# Patient Record
Sex: FEMALE | Race: WHITE | Employment: UNEMPLOYED | URBAN - METROPOLITAN AREA
[De-identification: names, ages, dates, MRNs, and addresses within clinical notes are randomized per-mention and may not be internally consistent; named-entity substitution may affect disease eponyms.]

---

## 2016-09-06 LAB — N. GONORRHEA, EXTERNAL: NEGATIVE

## 2016-09-09 LAB
HBSAG, EXTERNAL: NEGATIVE
HCT, EXTERNAL: 40.4
HGB, EXTERNAL: 13.5
HIV, EXTERNAL: NEGATIVE
PLATELET CNT,   EXTERNAL: 229
RPR, EXTERNAL: NON REACTIVE
RUBELLA, EXTERNAL: NORMAL
TSH SERPL-ACNC: 1.17 M[IU]/L
TYPE, ABO & RH, EXTERNAL: NORMAL
URINALYSIS, EXTERNAL: NEGATIVE

## 2016-09-12 LAB — PAP SMEAR, EXTERNAL: NEGATIVE

## 2016-10-11 LAB — CHLAMYDIA, EXTERNAL: NEGATIVE

## 2017-01-13 ENCOUNTER — HOSPITAL ENCOUNTER (OUTPATIENT)
Dept: LAB | Age: 37
Discharge: HOME | End: 2017-01-13

## 2017-01-13 ENCOUNTER — ROUTINE PRENATAL (OUTPATIENT)
Dept: OBGYN CLINIC | Age: 37
End: 2017-01-13

## 2017-01-13 VITALS
HEIGHT: 68 IN | RESPIRATION RATE: 18 BRPM | DIASTOLIC BLOOD PRESSURE: 75 MMHG | BODY MASS INDEX: 32.74 KG/M2 | SYSTOLIC BLOOD PRESSURE: 111 MMHG | WEIGHT: 216 LBS | HEART RATE: 81 BPM

## 2017-01-13 DIAGNOSIS — Z3A.33 33 WEEKS GESTATION OF PREGNANCY: Primary | ICD-10-CM

## 2017-01-13 PROCEDURE — 99001 SPECIMEN HANDLING PT-LAB: CPT | Performed by: OBSTETRICS & GYNECOLOGY

## 2017-01-13 RX ORDER — GABAPENTIN 300 MG/1
CAPSULE ORAL
Qty: 90 CAP | Refills: 0 | Status: SHIPPED | OUTPATIENT
Start: 2017-01-13 | End: 2017-02-08 | Stop reason: SDUPTHER

## 2017-01-13 NOTE — PROGRESS NOTES
Pt tolerated Flu vaccine, TDaP IM injection to rt deltoid without any problem. VIS information given to patient for both vaccine.

## 2017-01-13 NOTE — PROGRESS NOTES
Patient without complaints, good fetal movement. Last visit 14 weeks ago, patient states she has been having problems with severe social anxiety and lack of transportation. Emphasized to her the importance of regular visits, and she agrees to comply. Again discussed smoking cessation, patient states she has cut back to about 3/day. 1 hour GTT, flu vaccine, and TDaP today. Third trimester packet given. Follow up 2 weeks on rotation. Plan of care discussed. Patient expressed understanding.

## 2017-01-14 DIAGNOSIS — Z3A.33 33 WEEKS GESTATION OF PREGNANCY: ICD-10-CM

## 2017-01-14 LAB
BASOPHILS # BLD AUTO: 0 X10E3/UL (ref 0–0.2)
BASOPHILS NFR BLD AUTO: 0 %
EOSINOPHIL # BLD AUTO: 0.1 X10E3/UL (ref 0–0.4)
EOSINOPHIL NFR BLD AUTO: 1 %
ERYTHROCYTE [DISTWIDTH] IN BLOOD BY AUTOMATED COUNT: 13.2 % (ref 12.3–15.4)
GLUCOSE 1H P 50 G GLC PO SERPL-MCNC: 87 MG/DL (ref 65–139)
GTT, 1 HR, GLUCOLA, EXTERNAL: 97
HCT VFR BLD AUTO: 38.9 % (ref 34–46.6)
HGB BLD-MCNC: 13.3 G/DL (ref 11.1–15.9)
IMM GRANULOCYTES # BLD: 0.1 X10E3/UL (ref 0–0.1)
IMM GRANULOCYTES NFR BLD: 1 %
LYMPHOCYTES # BLD AUTO: 1.6 X10E3/UL (ref 0.7–3.1)
LYMPHOCYTES NFR BLD AUTO: 15 %
MCH RBC QN AUTO: 31.1 PG (ref 26.6–33)
MCHC RBC AUTO-ENTMCNC: 34.2 G/DL (ref 31.5–35.7)
MCV RBC AUTO: 91 FL (ref 79–97)
MONOCYTES # BLD AUTO: 1 X10E3/UL (ref 0.1–0.9)
MONOCYTES NFR BLD AUTO: 9 %
NEUTROPHILS # BLD AUTO: 8.2 X10E3/UL (ref 1.4–7)
NEUTROPHILS NFR BLD AUTO: 74 %
PLATELET # BLD AUTO: 245 X10E3/UL (ref 150–379)
RBC # BLD AUTO: 4.28 X10E6/UL (ref 3.77–5.28)
WBC # BLD AUTO: 10.9 X10E3/UL (ref 3.4–10.8)

## 2017-01-27 ENCOUNTER — ROUTINE PRENATAL (OUTPATIENT)
Dept: OBGYN CLINIC | Age: 37
End: 2017-01-27

## 2017-01-27 VITALS
DIASTOLIC BLOOD PRESSURE: 70 MMHG | BODY MASS INDEX: 32.74 KG/M2 | HEART RATE: 85 BPM | WEIGHT: 216 LBS | HEIGHT: 68 IN | SYSTOLIC BLOOD PRESSURE: 111 MMHG

## 2017-01-27 DIAGNOSIS — Z3A.35 35 WEEKS GESTATION OF PREGNANCY: ICD-10-CM

## 2017-01-27 DIAGNOSIS — Z34.83 PRENATAL CARE, SUBSEQUENT PREGNANCY, THIRD TRIMESTER: Primary | ICD-10-CM

## 2017-01-27 LAB — GRBS, EXTERNAL: NORMAL

## 2017-01-27 RX ORDER — ACETAMINOPHEN 500 MG
2 TABLET ORAL AS NEEDED
COMMUNITY
End: 2017-02-08 | Stop reason: SDUPTHER

## 2017-01-27 NOTE — MR AVS SNAPSHOT
Visit Information Date & Time Provider Department Dept. Phone Encounter #  
 1/27/2017  1:30 PM Johnny Ramirez, [facility] [phone] [id number] Follow-up Instructions Return in about 1 week (around 2/3/2017). Upcoming Health Maintenance Date Due  
 PAP AKA CERVICAL CYTOLOGY 9/6/2019 Allergies as of 1/27/2017  Review Complete On: 1/27/2017 By: Johnny Ramirez DO No Known Allergies Current Immunizations  Reviewed on 9/13/2010 Name Date Influenza Vaccine (Quad) PF 1/13/2017 Tdap 1/13/2017 Not reviewed this visit You Were Diagnosed With   
  
 Codes Comments Prenatal care, subsequent pregnancy, third trimester    -  Primary ICD-10-CM: Z34.83 ICD-9-CM: V22.1 35 weeks gestation of pregnancy     ICD-10-CM: Z3A.35 
ICD-9-CM: V22.2 Vitals BP Pulse Height(growth percentile) Weight(growth percentile) LMP BMI  
 111/70 85 5' 8\" (1.727 m) 216 lb (98 kg) 05/21/2016 32.84 kg/m2 OB Status Smoking Status Pregnant Current Every Day Smoker BMI and BSA Data Body Mass Index Body Surface Area  
 32.84 kg/m 2 2.17 m 2 Preferred Pharmacy Pharmacy Name Phone [facility] [address] [phone] Your Updated Medication List  
  
   
This list is accurate as of: 1/27/17  2:04 PM.  Always use your most recent med list.  
  
  
  
  
 gabapentin 300 mg capsule Commonly known as:  NEURONTIN  
TAKE 1 CAPSULE BY MOUTH THREE TIMES DAILY  
  
 lamoTRIgine 100 mg tablet Commonly known as: LaMICtal  
Take 3 tabs by mouth daily. nicotine 2 mg gum Commonly known as:  Pallavi Aries Take 2 mg by mouth as needed for Smoking Cessation. pnv w/o calcium-iron fum-fa 27-1 mg Tab Take 1 Tab by mouth daily. Follow-up Instructions Return in about 1 week (around 2/3/2017). To-Do List   
 01/27/2017 Imaging: AMB POC US OB >= 14 WKS, 1ST GESTATION   
  
 2017 Lab:  CHLAMYDIA/NEISSERIA/TRICHOMONAS AMP   
  
 2017 Microbiology:  CULTURE, GENITAL GROUP B STREP Patient Instructions Weeks 34 to 36 of Your Pregnancy: Care Instructions Your Care Instructions By now, your baby and your belly have grown quite large. It is almost time to give birth. A full-term pregnancy can deliver between 37 and 42 weeks. Your baby's lungs are almost ready to breathe air. The bones in your baby's head are now firm enough to protect it, but soft enough to move down through the birth canal. 
You may feel excited, happy, anxious, or scared. You may wonder how you will know if you are in labor or what to expect during labor. Try to be flexible in your expectations of the birth. Because each birth is different, there is no way to know exactly what childbirth will be like for you. This care sheet will help you know what to expect and how to prepare. This may make your childbirth easier. If you haven't already had the Tdap shot during this pregnancy, talk to your doctor about getting it. It will help protect your  against pertussis infection. In the 36th week, most women have a test for group B streptococcus (GBS). GBS is a common bacteria that can live in the vagina and rectum. It can make your baby sick after birth. If you test positive, you will get antibiotics during labor. The medicine will keep your baby from getting the bacteria. Follow-up care is a key part of your treatment and safety. Be sure to make and go to all appointments, and call your doctor if you are having problems. It's also a good idea to know your test results and keep a list of the medicines you take. How can you care for yourself at home? Learn about pain relief choices · Pain is different for every woman. Talk with your doctor about your feelings about pain. · You can choose from several types of pain relief. These include medicine or breathing techniques, as well as comfort measures. You can use more than one option. · If you choose to have pain medicine during labor, talk to your doctor about your options. Some medicines lower anxiety and help with some of the pain. Others make your lower body numb so that you won't feel pain. · Be sure to tell your doctor about your pain medicine choice before you start labor or very early in your labor. You may be able to change your mind as labor progresses. · Rarely, a woman is put to sleep by medicine given through a mask or an IV. Labor and delivery · The first stage of labor has three parts: early, active, and transition. ¨ Most women have early labor at home. You can stay busy or rest, eat light snacks, drink clear fluids, and start counting contractions. ¨ When talking during a contraction gets hard, you may be moving to active labor. During active labor, you should head for the hospital if you are not there already. ¨ You are in active labor when contractions come every 3 to 4 minutes and last about 60 seconds. Your cervix is opening more rapidly. ¨ If your water breaks, contractions will come faster and stronger. ¨ During transition, your cervix is stretching, and contractions are coming more rapidly. ¨ You may want to push, but your cervix might not be ready. Your doctor will tell you when to push. · The second stage starts when your cervix is completely opened and you are ready to push. ¨ Contractions are very strong to push the baby down the birth canal. 
¨ You will feel the urge to push. You may feel like you need to have a bowel movement. ¨ You may be coached to push with contractions. These contractions will be very strong, but you will not have them as often. You can get a little rest between contractions. ¨ You may be emotional and irritable. You may not be aware of what is going on around you. ¨ One last push, and your baby is born. · The third stage is when a few more contractions push out the placenta. This may take 30 minutes or less. · The fourth stage is the welcome recovery. You may feel overwhelmed with emotions and exhausted but alert. This is a good time to start breastfeeding. Where can you learn more? Go to http://leticia-steffi.info/. Enter J383 in the search box to learn more about \"Weeks 34 to 36 of Your Pregnancy: Care Instructions. \" Current as of: May 30, 2016 Content Version: 11.1 © 3476-9870 [employer] Care instructions adapted under license by [employer] (which disclaims liability or warranty for this information). If you have questions about a medical condition or this instruction, always ask your healthcare professional. [address] any warranty or liability for your use of this information. Introducing [facility] & HEALTH [facility]! Eryn Castellanos introduces Moment.me patient portal. Now you can access parts of your medical record, email your doctor's office, and request medication refills online. 1. In your internet browser, go to [email or web address] 2. Click on the First Time User? Click Here link in the Sign In box. You will see the New Member Sign Up page. 3. Enter your Moment.me Access Code exactly as it appears below. You will not need to use this code after youve completed the sign-up process. If you do not sign up before the expiration date, you must request a new code. · [email or web address].me Access Code: [email or web address] Expires: 4/27/2017  2:04 PM 
 
4. Enter the last four digits of your Social Security Number (xxxx) and Date of Birth (mm/dd/yyyy) as indicated and click Submit. You will be taken to the next sign-up page. 5. Create a Moment.me ID. This will be your Moment.me login ID and cannot be changed, so think of one that is secure and easy to remember. 6. Create a tripJane password. You can change your password at any time. 7. Enter your Password Reset Question and Answer. This can be used at a later time if you forget your password. 8. Enter your e-mail address. You will receive e-mail notification when new information is available in [address] 9. Click Sign Up. You can now view and download portions of your medical record. 10. Click the Download Summary menu link to download a portable copy of your medical information. If you have questions, please visit the Frequently Asked Questions section of the tripJane website. Remember, tripJane is NOT to be used for urgent needs. For medical emergencies, dial 911. Now available from your iPhone and Android! Please provide this summary of care documentation to your next provider. Your primary care clinician is listed as [id number] [facility]. If you have any questions after today's visit, please call [phone].

## 2017-01-27 NOTE — PROGRESS NOTES
35w6d. Patient doing well. Denies contractions, decreased fetal movement, vaginal bleeding, leak of fluid. Social anxiety, controlled with Lamictal and Neurontin. GBS/GC/CT today. GCT neg. RTO in 1 week  US for EFW ordered. Was supposed to get US with MFM for additional heart views, but didn't get it. I have verbalized the plan of care with patient. The patient was given a full opportunity to ask questions, indicated that her questions had been answered and expressed understanding.

## 2017-01-27 NOTE — PATIENT INSTRUCTIONS

## 2017-01-28 DIAGNOSIS — Z34.83 PRENATAL CARE, SUBSEQUENT PREGNANCY, THIRD TRIMESTER: ICD-10-CM

## 2017-01-31 LAB
B-HEM STREP SPEC QL CULT: NEGATIVE
C TRACH RRNA SPEC QL NAA+PROBE: NEGATIVE
CHLAMYDIA, EXTERNAL: NEGATIVE
N GONORRHOEA RRNA SPEC QL NAA+PROBE: NEGATIVE
T VAGINALIS RRNA SPEC QL NAA+PROBE: NEGATIVE

## 2017-02-01 ENCOUNTER — ROUTINE PRENATAL (OUTPATIENT)
Dept: OBGYN CLINIC | Age: 37
End: 2017-02-01

## 2017-02-01 VITALS
SYSTOLIC BLOOD PRESSURE: 122 MMHG | HEART RATE: 74 BPM | BODY MASS INDEX: 33.4 KG/M2 | HEIGHT: 68 IN | WEIGHT: 220.4 LBS | DIASTOLIC BLOOD PRESSURE: 69 MMHG

## 2017-02-01 DIAGNOSIS — Z3A.36 36 WEEKS GESTATION OF PREGNANCY: ICD-10-CM

## 2017-02-01 DIAGNOSIS — Z34.83 PRENATAL CARE, SUBSEQUENT PREGNANCY, THIRD TRIMESTER: Primary | ICD-10-CM

## 2017-02-01 NOTE — PROGRESS NOTES
Patient without complaints, good fetal movement. Interval weight scan reviewed, fetus at 58 %ile and no abnormalities noted. GBS reviewed and negative. Follow up 1 week on rotation. Plan of care discussed. Patient expressed understanding.

## 2017-02-08 ENCOUNTER — ROUTINE PRENATAL (OUTPATIENT)
Dept: OBGYN CLINIC | Age: 37
End: 2017-02-08

## 2017-02-08 VITALS
HEIGHT: 69 IN | SYSTOLIC BLOOD PRESSURE: 113 MMHG | HEART RATE: 87 BPM | DIASTOLIC BLOOD PRESSURE: 56 MMHG | BODY MASS INDEX: 32.29 KG/M2 | WEIGHT: 218 LBS

## 2017-02-08 DIAGNOSIS — F31.9 BIPOLAR 1 DISORDER (HCC): ICD-10-CM

## 2017-02-08 DIAGNOSIS — Z34.93 PRENATAL CARE, THIRD TRIMESTER: Primary | ICD-10-CM

## 2017-02-08 DIAGNOSIS — Z3A.37 37 WEEKS GESTATION OF PREGNANCY: ICD-10-CM

## 2017-02-08 DIAGNOSIS — Z71.6 ENCOUNTER FOR SMOKING CESSATION COUNSELING: ICD-10-CM

## 2017-02-08 RX ORDER — ACETAMINOPHEN 500 MG
2 TABLET ORAL AS NEEDED
Qty: 380 EACH | Refills: 5 | Status: SHIPPED | OUTPATIENT
Start: 2017-02-08 | End: 2017-02-15 | Stop reason: ALTCHOICE

## 2017-02-08 RX ORDER — GABAPENTIN 300 MG/1
CAPSULE ORAL
Qty: 90 CAP | Refills: 0 | Status: CANCELLED | OUTPATIENT
Start: 2017-02-08

## 2017-02-08 RX ORDER — GABAPENTIN 300 MG/1
300 CAPSULE ORAL 3 TIMES DAILY
Qty: 90 CAP | Refills: 0 | Status: SHIPPED | OUTPATIENT
Start: 2017-02-08 | End: 2017-03-05 | Stop reason: SDUPTHER

## 2017-02-08 RX ORDER — ACETAMINOPHEN 500 MG
2 TABLET ORAL AS NEEDED
Status: CANCELLED | OUTPATIENT
Start: 2017-02-08

## 2017-02-08 NOTE — PATIENT INSTRUCTIONS
Week 37 of Your Pregnancy: Care Instructions  Your Care Instructions    You are near the end of your pregnancy--and you're probably pretty uncomfortable. It may be harder to walk around. Lying down probably isn't comfortable either. You may have trouble getting to sleep or staying asleep. Most women deliver their babies between 40 and 41 weeks. This is a good time to think about packing a bag for the hospital with items you'll need. Then you'll be ready when labor starts. Follow-up care is a key part of your treatment and safety. Be sure to make and go to all appointments, and call your doctor if you are having problems. It's also a good idea to know your test results and keep a list of the medicines you take. How can you care for yourself at home? Learn about breastfeeding  · Breastfeeding is best for your baby and good for you. · Breast milk has antibodies to help your baby fight infections. · Mothers who breastfeed often lose weight faster, because making milk burns calories. · Learning the best ways to hold your baby will make breastfeeding easier. · Let your partner bathe and diaper the baby to keep your partner from feeling left out. Snuggle together when you breastfeed. · You may want to learn how to use a breast pump and store your milk. · If you choose to bottle feed, make the feeding feel like breastfeeding so you can bond with your baby. Always hold your baby and the bottle. Do not prop bottles or let your baby fall asleep with a bottle. Learn about crying  · It is common for babies to cry for 1 to 3 hours a day. Some cry more, some cry less. · Babies don't cry to make you upset or because you are a bad parent. · Crying is how your baby communicates. Your baby may be hungry; have gas; need a diaper change; or feel cold, warm, tired, lonely, or tense. Sometimes babies cry for unknown reasons. · If you respond to your baby's needs, he or she will learn to trust you.   · Try to stay calm when your baby cries. Your baby may get more upset if he or she senses that you are upset. Know how to care for your   · Your baby's umbilical cord stump will drop off on its own, usually between 1 and 2 weeks. To care for your baby's umbilical cord area:  ¨ Clean the area at the bottom of the cord 2 or 3 times a day. ¨ Pay special attention to the area where the cord attaches to the skin. ¨ Keep the diaper folded below the cord. ¨ Use a damp washcloth or cotton ball to sponge bathe your baby until the stump has come off. · Your baby's first dark stool is called meconium. After the meconium is passed, your baby will develop his or her own bowel pattern. ¨ Some babies, especially  babies, have several bowel movements a day. Others have one or two a day, or one every 2 to 3 days. ¨  babies often have loose, yellow stools. Formula-fed babies have more formed stools. ¨ If your baby's stools look like little pellets, he or she is constipated. After 2 days of constipation, call your baby's doctor. · If your baby will be circumcised, you can care for him at home. ¨ Gently rinse his penis with warm water after every diaper change. Do not try to remove the film that forms on the penis. This film will go away on its own. Pat dry. ¨ Put petroleum ointment, such as Vaseline, on the area of the diaper that will touch your baby's penis. This will keep the diaper from sticking to your baby. ¨ Ask the doctor about giving your baby acetaminophen (Tylenol) for pain. Where can you learn more? Go to http://leticia-steffi.info/. Enter 68 21 97 in the search box to learn more about \"Week 37 of Your Pregnancy: Care Instructions. \"  Current as of: May 30, 2016  Content Version: 11.1  © 8111-1192 [employer]. Care instructions adapted under license by [employer] (which disclaims liability or warranty for this information).  If you have questions about a medical condition or this instruction, always ask your healthcare professional. [place] [address] any warranty or liability for your use of this information.

## 2017-02-08 NOTE — PROGRESS NOTES
37w4d.  Jefe baird ctx. No LOF/VB. Normal fetal movement. Requesting refill for nicorette and gabapentin. Plans to continue smoking cessation postpartum, denies use of cigarettes. Needs gabapentin for mood stabilization. States she cannot function without it. She is aware of potential fetal risks. Encouraged to contact pregnancy registry. Information provided. GBS neg  Labor precautions. Follow up 1 week. Plan of care discussed. Patient expressed understanding.

## 2017-02-08 NOTE — MR AVS SNAPSHOT
Visit Information Date & Time Provider Department Dept. Phone Encounter #  
 2/8/2017  1:30 PM Juanita Azul DO [facility] [phone] [id number] Follow-up Instructions Return in about 1 week (around 2/15/2017) for cont rotation. Upcoming Health Maintenance Date Due  
 PAP AKA CERVICAL CYTOLOGY 9/6/2019 Allergies as of 2/8/2017  Review Complete On: 2/8/2017 By: Juanita Azul DO No Known Allergies Current Immunizations  Reviewed on 9/13/2010 Name Date Influenza Vaccine (Quad) PF 1/13/2017 Tdap 1/13/2017 Not reviewed this visit You Were Diagnosed With   
  
 Codes Comments Prenatal care, third trimester    -  Primary ICD-10-CM: Z34.93 
ICD-9-CM: V22.1 37 weeks gestation of pregnancy     ICD-10-CM: Z3A.37 
ICD-9-CM: V22.2 Encounter for smoking cessation counseling     ICD-10-CM: Z71.6, Z72.0 ICD-9-CM: V65.42, 305.1 Bipolar 1 disorder (HCC)     ICD-10-CM: F31.9 ICD-9-CM: 296.7 Vitals BP Pulse Height(growth percentile) Weight(growth percentile) LMP BMI  
 113/56 87 5' 9\" (1.753 m) 218 lb (98.9 kg) 05/21/2016 32.19 kg/m2 OB Status Smoking Status Pregnant Current Every Day Smoker BMI and BSA Data Body Mass Index Body Surface Area  
 32.19 kg/m 2 2.19 m 2 Preferred Pharmacy Pharmacy Name Phone [facility] [address] [phone] Your Updated Medication List  
  
   
This list is accurate as of: 2/8/17  2:29 PM.  Always use your most recent med list.  
  
  
  
  
 gabapentin 300 mg capsule Commonly known as:  NEURONTIN Take 1 Cap by mouth three (3) times daily. lamoTRIgine 100 mg tablet Commonly known as: LaMICtal  
Take 3 tabs by mouth daily. nicotine 2 mg gum Commonly known as:  Landry Johny Take 1 Each by mouth as needed for Smoking Cessation (request cinnamon flavor). Indications: SMOKING CESSATION  
  
 pnv w/o calcium-iron fum-fa 27-1 mg Tab Take 1 Tab by mouth daily. Prescriptions Sent to Pharmacy Refills  
 nicotine (NICORETTE) 2 mg gum 5 Sig: Take 1 Each by mouth as needed for Smoking Cessation (request cinnamon flavor). Indications: SMOKING CESSATION Class: Normal  
 Pharmacy: [facility] [address] Ph #: [phone] Route: Oral  
 gabapentin (NEURONTIN) 300 mg capsule 0 Sig: Take 1 Cap by mouth three (3) times daily. Class: Normal  
 Pharmacy: [facility] Ph #: [phone] Route: Oral  
  
Follow-up Instructions Return in about 1 week (around 2/15/2017) for cont rotation. Patient Instructions Week 37 of Your Pregnancy: Care Instructions Your Care Instructions You are near the end of your pregnancyand you're probably pretty uncomfortable. It may be harder to walk around. Lying down probably isn't comfortable either. You may have trouble getting to sleep or staying asleep. Most women deliver their babies between 40 and 41 weeks. This is a good time to think about packing a bag for the hospital with items you'll need. Then you'll be ready when labor starts. Follow-up care is a key part of your treatment and safety. Be sure to make and go to all appointments, and call your doctor if you are having problems. It's also a good idea to know your test results and keep a list of the medicines you take. How can you care for yourself at home? Learn about breastfeeding · Breastfeeding is best for your baby and good for you. · Breast milk has antibodies to help your baby fight infections. · Mothers who breastfeed often lose weight faster, because making milk burns calories. · Learning the best ways to hold your baby will make breastfeeding easier. · Let your partner bathe and diaper the baby to keep your partner from feeling left out. Snuggle together when you breastfeed. · You may want to learn how to use a breast pump and store your milk. · If you choose to bottle feed, make the feeding feel like breastfeeding so you can bond with your baby. Always hold your baby and the bottle. Do not prop bottles or let your baby fall asleep with a bottle. Learn about crying · It is common for babies to cry for 1 to 3 hours a day. Some cry more, some cry less. · Babies don't cry to make you upset or because you are a bad parent. · Crying is how your baby communicates. Your baby may be hungry; have gas; need a diaper change; or feel cold, warm, tired, lonely, or tense. Sometimes babies cry for unknown reasons. · If you respond to your baby's needs, he or she will learn to trust you. · Try to stay calm when your baby cries. Your baby may get more upset if he or she senses that you are upset. Know how to care for your  · Your baby's umbilical cord stump will drop off on its own, usually between 1 and 2 weeks. To care for your baby's umbilical cord area: ¨ Clean the area at the bottom of the cord 2 or 3 times a day. ¨ Pay special attention to the area where the cord attaches to the skin. ¨ Keep the diaper folded below the cord. ¨ Use a damp washcloth or cotton ball to sponge bathe your baby until the stump has come off. · Your baby's first dark stool is called meconium. After the meconium is passed, your baby will develop his or her own bowel pattern. ¨ Some babies, especially  babies, have several bowel movements a day. Others have one or two a day, or one every 2 to 3 days. ¨  babies often have loose, yellow stools. Formula-fed babies have more formed stools.  
¨ If your baby's stools look like little pellets, he or she is constipated. After 2 days of constipation, call your baby's doctor. · If your baby will be circumcised, you can care for him at home. ¨ Gently rinse his penis with warm water after every diaper change. Do not try to remove the film that forms on the penis. This film will go away on its own. Pat dry. ¨ Put petroleum ointment, such as Vaseline, on the area of the diaper that will touch your baby's penis. This will keep the diaper from sticking to your baby. ¨ Ask the doctor about giving your baby acetaminophen (Tylenol) for pain. Where can you learn more? Go to http://leticiaBright Beginnings Daycaresteffi.info/. Enter 68 21 97 in the search box to learn more about \"Week 37 of Your Pregnancy: Care Instructions. \" Current as of: May 30, 2016 Content Version: 11.1 © 4176-7941 [employer]. Care instructions adapted under license by [employer] (which disclaims liability or warranty for this information). If you have questions about a medical condition or this instruction, always ask your healthcare professional. [place] [address] any warranty or liability for your use of this information. Introducing [facility] & HEALTH SERVICES! Yoanna Rayo introduces opentabs patient portal. Now you can access parts of your medical record, email your doctor's office, and request medication refills online. 1. In your internet browser, go to [email or web address] 2. Click on the First Time User? Click Here link in the Sign In box. You will see the New Member Sign Up page. 3. Enter your opentabs Access Code exactly as it appears below. You will not need to use this code after youve completed the sign-up process. If you do not sign up before the expiration date, you must request a new code. · opentabs Access Code: [email or web address] Expires: 4/27/2017  2:04 PM 
 
4.  Enter the last four digits of your Social Security Number (xxxx) and Date of Birth (mm/dd/yyyy) as indicated and click Submit. You will be taken to the next sign-up page. 5. Create a [employer] ID. This will be your [employer] login ID and cannot be changed, so think of one that is secure and easy to remember. 6. Create a [employer] password. You can change your password at any time. 7. Enter your Password Reset Question and Answer. This can be used at a later time if you forget your password. 8. Enter your e-mail address. You will receive e-mail notification when new information is available in [address] 9. Click Sign Up. You can now view and download portions of your medical record. 10. Click the Download Summary menu link to download a portable copy of your medical information. If you have questions, please visit the Frequently Asked Questions section of the [employer] website. Remember, [employer] is NOT to be used for urgent needs. For medical emergencies, dial 911. Now available from your iPhone and Android! Please provide this summary of care documentation to your next provider. Your primary care clinician is listed as [phone] [facility]. If you have any questions after today's visit, please call [phone].

## 2017-02-09 DIAGNOSIS — Z34.83 PRENATAL CARE, SUBSEQUENT PREGNANCY, THIRD TRIMESTER: ICD-10-CM

## 2017-02-10 ENCOUNTER — TELEPHONE (OUTPATIENT)
Dept: OBGYN CLINIC | Age: 37
End: 2017-02-10

## 2017-02-10 NOTE — TELEPHONE ENCOUNTER
Ms. Pal Destiney called stating the nicorette cinnamon gum that is 2mg with 380 in the pack isn't covered by her insurance.  She asked to have refills sent of the previous dose which is nicorette cinnamon 4mg 100 pack

## 2017-02-13 NOTE — TELEPHONE ENCOUNTER
Please call the patient and tell her that the prescription has been fixed. It's now 2 mg #200, which is covered.

## 2017-02-15 ENCOUNTER — ROUTINE PRENATAL (OUTPATIENT)
Dept: OBGYN CLINIC | Age: 37
End: 2017-02-15

## 2017-02-15 VITALS
SYSTOLIC BLOOD PRESSURE: 117 MMHG | HEIGHT: 69 IN | HEART RATE: 84 BPM | BODY MASS INDEX: 33.03 KG/M2 | DIASTOLIC BLOOD PRESSURE: 52 MMHG | WEIGHT: 223 LBS

## 2017-02-15 DIAGNOSIS — F17.200 SMOKING ADDICTION: Primary | ICD-10-CM

## 2017-02-15 RX ORDER — ACETAMINOPHEN 500 MG
2 TABLET ORAL
Qty: 100 EACH | Refills: 3 | Status: SHIPPED | OUTPATIENT
Start: 2017-02-15 | End: 2017-02-15 | Stop reason: DRUGHIGH

## 2017-02-15 RX ORDER — ACETAMINOPHEN 500 MG
4 TABLET ORAL
Qty: 100 EACH | Refills: 3 | Status: SHIPPED | OUTPATIENT
Start: 2017-02-15 | End: 2017-03-17

## 2017-02-15 NOTE — MR AVS SNAPSHOT
Visit Information Date & Time Provider Department Dept. Phone Encounter #  
 2/15/2017  2:30 PM Nkechi Jay MD [facility] [phone] [id number] Follow-up Instructions Return in about 1 week (around 2/22/2017). Follow-up and Disposition History Your Appointments 2/22/2017  2:30 PM  
OB VISIT with Eladia Noriega, [facility] ([facility]) Appt Note: ob  
 [facility] [phone]  
[phone]  
  
   
 [facility] [phone] Upcoming Health Maintenance Date Due  
 PAP AKA CERVICAL CYTOLOGY 9/6/2019 Allergies as of 2/15/2017  Review Complete On: 2/15/2017 By: Nkechi Jay MD  
 No Known Allergies Current Immunizations  Reviewed on 9/13/2010 Name Date Influenza Vaccine (Quad) PF 1/13/2017 Tdap 1/13/2017 Not reviewed this visit You Were Diagnosed With   
  
 Codes Comments Smoking addiction    -  Primary ICD-10-CM: C21.645 ICD-9-CM: 305.1 Vitals BP Pulse Height(growth percentile) Weight(growth percentile) LMP BMI  
 117/52 84 5' 9\" (1.753 m) 223 lb (101.2 kg) 05/21/2016 32.93 kg/m2 OB Status Smoking Status Pregnant Current Every Day Smoker BMI and BSA Data Body Mass Index Body Surface Area  
 32.93 kg/m 2 2.22 m 2 Preferred Pharmacy Pharmacy Name Phone [facility] [address] [phone] Your Updated Medication List  
  
   
This list is accurate as of: 2/15/17  5:02 PM.  Always use your most recent med list.  
  
  
  
  
 gabapentin 300 mg capsule Commonly known as:  NEURONTIN Take 1 Cap by mouth three (3) times daily. lamoTRIgine 100 mg tablet Commonly known as: LaMICtal  
Take 3 tabs by mouth daily. nicotine 2 mg gum Commonly known as:  Vivian Hernandez Take 2 Each by mouth every two (2) hours as needed for Smoking Cessation (Pt. desires cinammon) for up to 30 days. Indications: SMOKING CESSATION  
  
 pnv w/o calcium-iron fum-fa 27-1 mg Tab Take 1 Tab by mouth daily. Prescriptions Printed Refills  
 nicotine (NICORETTE) 2 mg gum 3 Sig: Take 2 Each by mouth every two (2) hours as needed for Smoking Cessation (Pt. desires cinammon) for up to 30 days. Indications: SMOKING CESSATION Class: Print Route: Oral  
  
Follow-up Instructions Return in about 1 week (around 2017). Patient Instructions Week 38 of Your Pregnancy: Care Instructions Your Care Instructions Believe it or not, your baby is almost here. You may have ideas about your baby's personality because of how much he or she moves. Or you may have noticed how he or she responds to sounds, warmth, cold, and light. You may even know what kind of music your baby likes. By now, you have a better idea of what to expect during delivery. You may have talked about your birth preferences with your doctor. But even if you want a vaginal birth, it is a good idea to learn about  births.  birth means that your baby is born through a cut (incision) in your lower belly. It is sometimes the best choice for the health of the baby and the mother. This care sheet can help you understand  births. It also gives you information about what to expect after your baby is born. And it helps you understand more about postpartum depression. Follow-up care is a key part of your treatment and safety. Be sure to make and go to all appointments, and call your doctor if you are having problems. It's also a good idea to know your test results and keep a list of the medicines you take. How can you care for yourself at home? Learn about  birth · Most C-sections are unplanned.  They are done because of problems that occur during labor. These problems might include: 
¨ Labor that slows or stops. ¨ High blood pressure or other problems for the mother. ¨ Signs of distress in the baby. These signs may include a very fast or slow heart rate. · Although most mothers and babies do well after , it is major surgery. It has more risks than a vaginal delivery. · In some cases, a planned  may be safer than a vaginal delivery. This may be the case if: ¨ The mother has a health problem, such as a heart condition. ¨ The baby isn't in a head-down position for delivery. This is called a breech position. ¨ The uterus has scars from past surgeries. This could increase the chance of a tear in the uterus. ¨ There is a problem with the placenta. ¨ The mother has an infection, such as genital herpes, that could be spread to the baby. ¨ The mother is having twins or more. ¨ The baby weighs 9 to 10 pounds or more. · Because of the risks of , planned C-sections generally should be done only for medical reasons. And a planned  should be done at 39 weeks or later unless there is a medical reason to do it sooner. Know what to expect after delivery, and plan for the first few weeks at home · You, your baby, and your partner or  will get identification bands. Only people with matching bands can  the baby from the nursery. · You will learn how to feed, diaper, and bathe your baby. And you will learn how to care for the umbilical cord stump. If your baby will be circumcised, you will also learn how to care for that. · Ask people to wait to visit you until you are at home. And ask them to wash their hands before they touch your baby. · Make sure you have another adult in your home for at least 2 or 3 days after the birth. · During the first 2 weeks, limit when friends and family can visit. · Do not allow visitors who have colds or infections.  Make sure all visitors are up to date with their vaccinations. Never let anyone smoke around your baby. · Try to nap when the baby naps. Be aware of postpartum depression · \"Baby blues\" are common for the first 1 to 2 weeks after birth. You may cry or feel sad or irritable for no reason. · For some women, these feelings last longer and are more intense. This is called postpartum depression. · If your symptoms last for more than a few weeks or you feel very depressed, ask your doctor for help. · Postpartum depression can be treated. Support groups and counseling can help. Sometimes medicine can also help. Where can you learn more? Go to http://leticia-steffi.info/. Enter B044 in the search box to learn more about \"Week 38 of Your Pregnancy: Care Instructions. \" Current as of: May 30, 2016 Content Version: 11.1 © 1356-8299 [employer]. Care instructions adapted under license by [employer] (which disclaims liability or warranty for this information). If you have questions about a medical condition or this instruction, always ask your healthcare professional. [address] any warranty or liability for your use of this information. Introducing [facility]! Ian Kennedy introduces [facility] [employer] patient portal. Now you can access parts of your medical record, email your doctor's office, and request medication refills online. 1. In your internet browser, go to [email or web address] 2. Click on the First Time User? Click Here link in the Sign In box. You will see the New Member Sign Up page. 3. Enter your [employer] Access Code exactly as it appears below. You will not need to use this code after youve completed the sign-up process. If you do not sign up before the expiration date, you must request a new code. · [employer] Access Code: [email or web address] Expires: 4/27/2017  2:04 PM 
 
 4. Enter the last four digits of your Social Security Number (xxxx) and Date of Birth (mm/dd/yyyy) as indicated and click Submit. You will be taken to the next sign-up page. 5. Create a [employer] ID. This will be your [employer] login ID and cannot be changed, so think of one that is secure and easy to remember. 6. Create a [employer] password. You can change your password at any time. 7. Enter your Password Reset Question and Answer. This can be used at a later time if you forget your password. 8. Enter your e-mail address. You will receive e-mail notification when new information is available in [address] 9. Click Sign Up. You can now view and download portions of your medical record. 10. Click the Download Summary menu link to download a portable copy of your medical information. If you have questions, please visit the Frequently Asked Questions section of the L[employer] website. Remember, [employer] is NOT to be used for urgent needs. For medical emergencies, dial 911. Now available from your iPhone and Android! Please provide this summary of care documentation to your next provider. Your primary care clinician is listed as [address]. If you have any questions after today's visit, please call [phone].

## 2017-02-15 NOTE — PROGRESS NOTES
38w4d.  No OB issues. Noted contractions 2 days ago, which subsided. SVE per request: cl/th  Pt denies LOF/Vb  RTO 1 week. Labor precautions. Previously prescribed Nicorette gum for 360 pieces was denied. Rx resent for 100 pieces.

## 2017-02-15 NOTE — PATIENT INSTRUCTIONS
Week 38 of Your Pregnancy: Care Instructions  Your Care Instructions    Believe it or not, your baby is almost here. You may have ideas about your baby's personality because of how much he or she moves. Or you may have noticed how he or she responds to sounds, warmth, cold, and light. You may even know what kind of music your baby likes. By now, you have a better idea of what to expect during delivery. You may have talked about your birth preferences with your doctor. But even if you want a vaginal birth, it is a good idea to learn about  births.  birth means that your baby is born through a cut (incision) in your lower belly. It is sometimes the best choice for the health of the baby and the mother. This care sheet can help you understand  births. It also gives you information about what to expect after your baby is born. And it helps you understand more about postpartum depression. Follow-up care is a key part of your treatment and safety. Be sure to make and go to all appointments, and call your doctor if you are having problems. It's also a good idea to know your test results and keep a list of the medicines you take. How can you care for yourself at home? Learn about  birth  · Most C-sections are unplanned. They are done because of problems that occur during labor. These problems might include:  ¨ Labor that slows or stops. ¨ High blood pressure or other problems for the mother. ¨ Signs of distress in the baby. These signs may include a very fast or slow heart rate. · Although most mothers and babies do well after , it is major surgery. It has more risks than a vaginal delivery. · In some cases, a planned  may be safer than a vaginal delivery. This may be the case if:  ¨ The mother has a health problem, such as a heart condition. ¨ The baby isn't in a head-down position for delivery. This is called a breech position.   ¨ The uterus has scars from past surgeries. This could increase the chance of a tear in the uterus. ¨ There is a problem with the placenta. ¨ The mother has an infection, such as genital herpes, that could be spread to the baby. ¨ The mother is having twins or more. ¨ The baby weighs 9 to 10 pounds or more. · Because of the risks of , planned C-sections generally should be done only for medical reasons. And a planned  should be done at 39 weeks or later unless there is a medical reason to do it sooner. Know what to expect after delivery, and plan for the first few weeks at home  · You, your baby, and your partner or  will get identification bands. Only people with matching bands can  the baby from the nursery. · You will learn how to feed, diaper, and bathe your baby. And you will learn how to care for the umbilical cord stump. If your baby will be circumcised, you will also learn how to care for that. · Ask people to wait to visit you until you are at home. And ask them to wash their hands before they touch your baby. · Make sure you have another adult in your home for at least 2 or 3 days after the birth. · During the first 2 weeks, limit when friends and family can visit. · Do not allow visitors who have colds or infections. Make sure all visitors are up to date with their vaccinations. Never let anyone smoke around your baby. · Try to nap when the baby naps. Be aware of postpartum depression  · \"Baby blues\" are common for the first 1 to 2 weeks after birth. You may cry or feel sad or irritable for no reason. · For some women, these feelings last longer and are more intense. This is called postpartum depression. · If your symptoms last for more than a few weeks or you feel very depressed, ask your doctor for help. · Postpartum depression can be treated. Support groups and counseling can help. Sometimes medicine can also help. Where can you learn more?   Go to http://leticia-steffi.info/. Enter B044 in the search box to learn more about \"Week 38 of Your Pregnancy: Care Instructions. \"  Current as of: May 30, 2016  Content Version: 11.1  © 0354-2007 [employer]. Care instructions adapted under license by [employer] (which disclaims liability or warranty for this information). If you have questions about a medical condition or this instruction, always ask your healthcare professional. [address] any warranty or liability for your use of this information.

## 2017-02-28 ENCOUNTER — HOSPITAL ENCOUNTER (EMERGENCY)
Age: 37
Discharge: HOME | End: 2017-02-28
Attending: OBSTETRICS & GYNECOLOGY | Admitting: OBSTETRICS & GYNECOLOGY
Payer: MEDICAID

## 2017-02-28 ENCOUNTER — ROUTINE PRENATAL (OUTPATIENT)
Dept: OBGYN CLINIC | Age: 37
End: 2017-02-28

## 2017-02-28 VITALS
RESPIRATION RATE: 18 BRPM | BODY MASS INDEX: 33.18 KG/M2 | HEART RATE: 76 BPM | WEIGHT: 224 LBS | SYSTOLIC BLOOD PRESSURE: 120 MMHG | DIASTOLIC BLOOD PRESSURE: 69 MMHG | HEIGHT: 69 IN

## 2017-02-28 VITALS
RESPIRATION RATE: 18 BRPM | DIASTOLIC BLOOD PRESSURE: 68 MMHG | HEART RATE: 67 BPM | TEMPERATURE: 98.1 F | SYSTOLIC BLOOD PRESSURE: 128 MMHG

## 2017-02-28 DIAGNOSIS — Z3A.40 40 WEEKS GESTATION OF PREGNANCY: ICD-10-CM

## 2017-02-28 DIAGNOSIS — Z34.83 PRENATAL CARE, SUBSEQUENT PREGNANCY, THIRD TRIMESTER: Primary | ICD-10-CM

## 2017-02-28 PROCEDURE — 59025 FETAL NON-STRESS TEST: CPT

## 2017-02-28 NOTE — PROGRESS NOTES
Patient complains of contractions, denies leaking of fluid or vaginal bleeding. Good fetal movement. Postdates induction of labor scheduled for 3/5 pm. Patient aware. To L&D for NST. Plan of care discussed. Patient expressed understanding.

## 2017-02-28 NOTE — IP AVS SNAPSHOT
Current Discharge Medication List  
  
ASK your doctor about these medications Dose & Instructions Dispensing Information Comments Morning Noon Evening Bedtime  
 gabapentin 300 mg capsule Commonly known as:  NEURONTIN Your next dose is: Today, Tomorrow Other:  ____________ Dose:  300 mg Take 1 Cap by mouth three (3) times daily. Quantity:  90 Cap Refills:  0  
     
   
   
   
  
 lamoTRIgine 100 mg tablet Commonly known as: LaMICtal  
   
Your next dose is: Today, Tomorrow Other:  ____________ Take 3 tabs by mouth daily. Quantity:  90 Tab Refills:  6  
     
   
   
   
  
 nicotine 2 mg gum Commonly known as:  Danita Ambrosia Your next dose is: Today, Tomorrow Other:  ____________ Dose:  4 mg Take 2 Each by mouth every two (2) hours as needed for Smoking Cessation (PtDeni streeter) for up to 30 days. Indications: SMOKING CESSATION Quantity:  100 Each Refills:  3  
     
   
   
   
  
 pnv w/o calcium-iron fum-fa 27-1 mg Tab Your next dose is: Today, Tomorrow Other:  ____________ Dose:  1 Tab Take 1 Tab by mouth daily. Quantity:  90 Tab Refills:  prn

## 2017-02-28 NOTE — IP AVS SNAPSHOT
[address] Patient: Angel Brennan MRN: [id number] OEI:0/02/7530 You are allergic to the following No active allergies Recent Documentation OB Status Pregnant Emergency Contacts Name Discharge Info Relation Home Work Mobile Isai Farrar  Mother [14]   [phone] Maria Dolores Ayala  Parent [1] [phone] Maria Dolores Strickland  Parent [1] [phone] About your hospitalization You were admitted on:  N/A You last received care in the:  [address] [address] You were discharged on:  February 28, 2017 Unit phone number:  [phone] Why you were hospitalized Your primary diagnosis was:  Not on File Providers Seen During Your Hospitalizations Provider Role Specialty Primary office phone Shelly Stevenson DO Attending Provider Obstetrics & Gynecology [phone] Your Primary Care Physician (PCP) Primary Care Physician Office Phone Office Fax [facility] [phone] Follow-up Information None Current Discharge Medication List  
  
ASK your doctor about these medications Dose & Instructions Dispensing Information Comments Morning Noon Evening Bedtime  
 gabapentin 300 mg capsule Commonly known as:  NEURONTIN Your next dose is: Today, Tomorrow Other:  _________ Dose:  300 mg Take 1 Cap by mouth three (3) times daily. Quantity:  90 Cap Refills:  0  
     
   
   
   
  
 lamoTRIgine 100 mg tablet Commonly known as: LaMICtal  
   
Your next dose is: Today, Tomorrow Other:  _________ Take 3 tabs by mouth daily. Quantity:  90 Tab Refills:  6  
     
   
   
   
  
 nicotine 2 mg gum Commonly known as:  Daksha Brighter Your next dose is: Today, Tomorrow Other:  _________ Dose:  4 mg Take 2 Each by mouth every two (2) hours as needed for Smoking Cessation (Pt. yaquelin streeter) for up to 30 days. Indications: SMOKING CESSATION Quantity:  100 Each Refills:  3  
     
   
   
   
  
 pnv w/o calcium-iron fum-fa 27-1 mg Tab Your next dose is: Today, Tomorrow Other:  _________ Dose:  1 Tab Take 1 Tab by mouth daily. Quantity:  90 Tab Refills:  prn Discharge Instructions None Discharge Instructions Attachments/References PREGNANCY: KICK COUNTS (ENGLISH) PREGNANCY: PRECAUTIONS (ENGLISH) Discharge Orders None Introducing [facility]! [facility] introduces Octmami patient portal. Now you can access parts of your medical record, email your doctor's office, and request medication refills online. 1. In your internet browser, go to [email or web address] 2. Click on the First Time User? Click Here link in the Sign In box. You will see the New Member Sign Up page. 3. Enter your Octmami Access Code exactly as it appears below. You will not need to use this code after youve completed the sign-up process. If you do not sign up before the expiration date, you must request a new code. · Octmami Access Code: [email or web address] Expires: 4/27/2017  2:04 PM 
 
4. Enter the last four digits of your Social Security Number (xxxx) and Date of Birth (mm/dd/yyyy) as indicated and click Submit. You will be taken to the next sign-up page. 5. Create a Spectrum Network[employer] ID. This will be your Octmami login ID and cannot be changed, so think of one that is secure and easy to remember. 6. Create a Octmami password. You can change your password at any time. 7. Enter your Password Reset Question and Answer. This can be used at a later time if you forget your password. 8. Enter your e-mail address. You will receive e-mail notification when new information is available in [address] 9. Click Sign Up. You can now view and download portions of your medical record. 10. Click the Download Summary menu link to download a portable copy of your medical information. If you have questions, please visit the Frequently Asked Questions section of the [email or web address] website. Remember, [email or web address] is NOT to be used for urgent needs. For medical emergencies, dial 911. Now available from your iPhone and Android! General Information Please provide this summary of care documentation to your next provider. Patient Signature:  ____________________________________________________________ Date:  ____________________________________________________________  
  
Tiffanie Thibodeaux Provider Signature:  ____________________________________________________________ Date:  ____________________________________________________________ More Information Counting Your Baby's Kicks: Care Instructions Your Care Instructions Counting your baby's kicks is one way your doctor can tell that your baby is healthy. Most womenespecially in a first pregnancyfeel their baby move for the first time between 16 and 22 weeks. The movement may feel like flutters rather than kicks. Your baby may move more at certain times of the day. When you are active, you may notice less kicking than when you are resting. At your prenatal visits, your doctor will ask whether the baby is active. In your last trimester, your doctor may ask you to count the number of times you feel your baby move. Follow-up care is a key part of your treatment and safety. Be sure to make and go to all appointments, and call your doctor if you are having problems. It's also a good idea to know your test results and keep a list of the medicines you take. How do you count fetal kicks?  
· A common method of checking your baby's movement is to count the number of kicks or moves you feel in 1 hour. Ten movements (such as kicks, flutters, or rolls) in 1 hour are normal. Some doctors suggest that you count in the morning until you get to 10 movements. Then you can quit for that day and start again the next day. · Pick your baby's most active time of day to count. This may be any time from morning to evening. · If you do not feel 10 movements in an hour, your baby may be sleeping. Wait for the next hour and count again. When should you call for help? Call your doctor now or seek immediate medical care if: 
· You noticed that your baby has stopped moving or is moving much less than normal. 
Watch closely for changes in your health, and be sure to contact your doctor if you have any problems. Where can you learn more? Go to http://leticia-steffi.info/. Enter X904 in the search box to learn more about \"Counting Your Baby's Kicks: Care Instructions. \" Current as of: May 30, 2016 Content Version: 11.1 © 8503-0618 [employer]. Care instructions adapted under license by [employer] (which disclaims liability or warranty for this information). If you have questions about a medical condition or this instruction, always ask your healthcare professional. [address] any warranty or liability for your use of this information. Pregnancy Precautions: Care Instructions Your Care Instructions There is no sure way to prevent labor before your due date ( labor) or to prevent most other pregnancy problems. But there are things you can do to increase your chances of a healthy pregnancy. Go to your appointments, follow your doctor's advice, and take good care of yourself. Eat well, and exercise (if your doctor agrees). And make sure to drink plenty of water. Follow-up care is a key part of your treatment and safety.  Be sure to make and go to all appointments, and call your doctor if you are having problems. It's also a good idea to know your test results and keep a list of the medicines you take. How can you care for yourself at home? · Make sure you go to your prenatal appointments. At each visit, your doctor will check your blood pressure. Your doctor will also check to see if you have protein in your urine. High blood pressure and protein in urine are signs of preeclampsia. This condition can be dangerous for you and your baby. · Drink plenty of fluids, enough so that your urine is light yellow or clear like water. Dehydration can cause contractions. If you have kidney, heart, or liver disease and have to limit fluids, talk with your doctor before you increase the amount of fluids you drink. · Tell your doctor right away if you notice any symptoms of an infection, such as: ¨ Burning when you urinate. ¨ A foul-smelling discharge from your vagina. ¨ Vaginal itching. ¨ Unexplained fever. ¨ Unusual pain or soreness in your uterus or lower belly. · Eat a balanced diet. Include plenty of foods that are high in calcium and iron. ¨ Foods high in calcium include milk, cheese, yogurt, almonds, and broccoli. ¨ Foods high in iron include red meat, shellfish, poultry, eggs, beans, raisins, whole-grain bread, and leafy green vegetables. · Do not smoke. If you need help quitting, talk to your doctor about stop-smoking programs and medicines. These can increase your chances of quitting for good. · Do not drink alcohol or use illegal drugs. · Follow your doctor's directions about activity. Your doctor will let you know how much, if any, exercise you can do. · Ask your doctor if you can have sex. If you are at risk for early labor, your doctor may ask you to not have sex. · Take care to prevent falls. During pregnancy, your joints are loose, and your balance is off. Sports such as bicycling, skiing, or in-line skating can increase your risk of falling.  And don't ride horses or motorcycles, dive, water ski, scuba dive, or parachute jump while you are pregnant. · Avoid getting very hot. Do not use saunas or hot tubs. Avoid staying out in the sun in hot weather for long periods. Take acetaminophen (Tylenol) to lower a high fever. · Do not take any over-the-counter or herbal medicines or supplements without talking to your doctor or pharmacist first. 
When should you call for help? Call 911 anytime you think you may need emergency care. For example, call if: 
· You passed out (lost consciousness). · You have severe vaginal bleeding. · You have severe pain in your belly or pelvis. · You have had fluid gushing or leaking from your vagina and you know or think the umbilical cord is bulging into your vagina. If this happens, immediately get down on your knees so your rear end (buttocks) is higher than your head. This will decrease the pressure on the cord until help arrives. Call your doctor now or seek immediate medical care if: 
· You have signs of preeclampsia, such as: 
¨ Sudden swelling of your face, hands, or feet. ¨ New vision problems (such as dimness or blurring). ¨ A severe headache. · You have any vaginal bleeding. · You have belly pain or cramping. · You have a fever. · You have had regular contractions (with or without pain) for an hour. This means that you have 8 or more within 1 hour or 4 or more in 20 minutes after you change your position and drink fluids. · You have a sudden release of fluid from your vagina. · You have low back pain or pelvic pressure that does not go away. · You notice that your baby has stopped moving or is moving much less than normal. 
Watch closely for changes in your health, and be sure to contact your doctor if you have any problems. Where can you learn more? Go to http://leticia-steffi.info/. Enter [id number] in the search box to learn more about \"Pregnancy Precautions: Care Instructions. \" Current as of: May 30, 2016 Content Version: 11.1 © 9247-9483 [employer]. Care instructions adapted under license by [employer] (which disclaims liability or warranty for this information). If you have questions about a medical condition or this instruction, always ask your healthcare professional. [address] any warranty or liability for your use of this information.

## 2017-02-28 NOTE — PROGRESS NOTES
403/7 wks sent from ob office for nst   For post dates  Pt  Placed on efm  Good fetal movement noted with accels   1700  Reactive nst  Dr Genaro Peterson given report  Pt inst to f/u 3/3 for repeat nst   Kick counts and labor precautions inst pt vu  Time for questions   [phone] pt disch to home

## 2017-03-05 DIAGNOSIS — F31.9 BIPOLAR 1 DISORDER (HCC): ICD-10-CM

## 2017-03-06 ENCOUNTER — ANESTHESIA EVENT (OUTPATIENT)
Dept: LABOR AND DELIVERY | Age: 37
DRG: 560 | End: 2017-03-06
Payer: MEDICAID

## 2017-03-06 ENCOUNTER — HOSPITAL ENCOUNTER (INPATIENT)
Age: 37
LOS: 2 days | Discharge: HOME | DRG: 560 | End: 2017-03-08
Attending: OBSTETRICS & GYNECOLOGY | Admitting: OBSTETRICS & GYNECOLOGY
Payer: MEDICAID

## 2017-03-06 ENCOUNTER — ANESTHESIA (OUTPATIENT)
Dept: LABOR AND DELIVERY | Age: 37
DRG: 560 | End: 2017-03-06
Payer: MEDICAID

## 2017-03-06 PROBLEM — O48.0 POST TERM PREGNANCY: Status: ACTIVE | Noted: 2017-03-06

## 2017-03-06 LAB
ABO + RH BLD: NORMAL
BASOPHILS # BLD AUTO: 0 K/UL (ref 0–0.06)
BASOPHILS # BLD: 0 % (ref 0–2)
BLOOD GROUP ANTIBODIES SERPL: NORMAL
DIFFERENTIAL METHOD BLD: ABNORMAL
EOSINOPHIL # BLD: 0.1 K/UL (ref 0–0.4)
EOSINOPHIL NFR BLD: 1 % (ref 0–5)
ERYTHROCYTE [DISTWIDTH] IN BLOOD BY AUTOMATED COUNT: 13.1 % (ref 11.6–14.5)
HCT VFR BLD AUTO: 42.2 % (ref 35–45)
HGB BLD-MCNC: 14.3 G/DL (ref 12–16)
LYMPHOCYTES # BLD AUTO: 19 % (ref 21–52)
LYMPHOCYTES # BLD: 2.1 K/UL (ref 0.9–3.6)
MCH RBC QN AUTO: 32.1 PG (ref 24–34)
MCHC RBC AUTO-ENTMCNC: 33.9 G/DL (ref 31–37)
MCV RBC AUTO: 94.8 FL (ref 74–97)
MONOCYTES # BLD: 1.1 K/UL (ref 0.05–1.2)
MONOCYTES NFR BLD AUTO: 10 % (ref 3–10)
NEUTS SEG # BLD: 7.7 K/UL (ref 1.8–8)
NEUTS SEG NFR BLD AUTO: 70 % (ref 40–73)
PLATELET # BLD AUTO: 193 K/UL (ref 135–420)
PMV BLD AUTO: 11.8 FL (ref 9.2–11.8)
RBC # BLD AUTO: 4.45 M/UL (ref 4.2–5.3)
SPECIMEN EXP DATE BLD: NORMAL
WBC # BLD AUTO: 11.1 K/UL (ref 4.6–13.2)

## 2017-03-06 PROCEDURE — 74011250636 HC RX REV CODE- 250/636: Performed by: OBSTETRICS & GYNECOLOGY

## 2017-03-06 PROCEDURE — 36415 COLL VENOUS BLD VENIPUNCTURE: CPT | Performed by: OBSTETRICS & GYNECOLOGY

## 2017-03-06 PROCEDURE — 65270000029 HC RM PRIVATE

## 2017-03-06 PROCEDURE — 74011250636 HC RX REV CODE- 250/636

## 2017-03-06 PROCEDURE — 74011250636 HC RX REV CODE- 250/636: Performed by: NURSE ANESTHETIST, CERTIFIED REGISTERED

## 2017-03-06 PROCEDURE — 74011000250 HC RX REV CODE- 250

## 2017-03-06 PROCEDURE — 10907ZC DRAINAGE OF AMNIOTIC FLUID, THERAPEUTIC FROM PRODUCTS OF CONCEPTION, VIA NATURAL OR ARTIFICIAL OPENING: ICD-10-PCS | Performed by: OBSTETRICS & GYNECOLOGY

## 2017-03-06 PROCEDURE — 00HU33Z INSERTION OF INFUSION DEVICE INTO SPINAL CANAL, PERCUTANEOUS APPROACH: ICD-10-PCS | Performed by: NURSE ANESTHETIST, CERTIFIED REGISTERED

## 2017-03-06 PROCEDURE — 77030007879 HC KT SPN EPDRL TELE -B: Performed by: ANESTHESIOLOGY

## 2017-03-06 PROCEDURE — 77030020255 HC SOL INJ LR 1000ML BG

## 2017-03-06 PROCEDURE — 77030034849

## 2017-03-06 PROCEDURE — 85025 COMPLETE CBC W/AUTO DIFF WBC: CPT | Performed by: OBSTETRICS & GYNECOLOGY

## 2017-03-06 PROCEDURE — 74011000250 HC RX REV CODE- 250: Performed by: NURSE ANESTHETIST, CERTIFIED REGISTERED

## 2017-03-06 PROCEDURE — 86900 BLOOD TYPING SEROLOGIC ABO: CPT | Performed by: OBSTETRICS & GYNECOLOGY

## 2017-03-06 PROCEDURE — 10H07YZ INSERTION OF OTHER DEVICE INTO PRODUCTS OF CONCEPTION, VIA NATURAL OR ARTIFICIAL OPENING: ICD-10-PCS | Performed by: OBSTETRICS & GYNECOLOGY

## 2017-03-06 RX ORDER — MISOPROSTOL 200 UG/1
800 TABLET ORAL
Status: DISCONTINUED | OUTPATIENT
Start: 2017-03-06 | End: 2017-03-07 | Stop reason: HOSPADM

## 2017-03-06 RX ORDER — OXYTOCIN/RINGER'S LACTATE 20/1000 ML
125 PLASTIC BAG, INJECTION (ML) INTRAVENOUS CONTINUOUS
Status: DISCONTINUED | OUTPATIENT
Start: 2017-03-06 | End: 2017-03-07 | Stop reason: HOSPADM

## 2017-03-06 RX ORDER — BUTORPHANOL TARTRATE 1 MG/ML
2 INJECTION INTRAMUSCULAR; INTRAVENOUS
Status: DISCONTINUED | OUTPATIENT
Start: 2017-03-06 | End: 2017-03-07 | Stop reason: HOSPADM

## 2017-03-06 RX ORDER — FENTANYL CITRATE 50 UG/ML
100 INJECTION, SOLUTION INTRAMUSCULAR; INTRAVENOUS ONCE
Status: COMPLETED | OUTPATIENT
Start: 2017-03-06 | End: 2017-03-06

## 2017-03-06 RX ORDER — SODIUM CHLORIDE 0.9 % (FLUSH) 0.9 %
5-10 SYRINGE (ML) INJECTION EVERY 8 HOURS
Status: DISCONTINUED | OUTPATIENT
Start: 2017-03-06 | End: 2017-03-07 | Stop reason: HOSPADM

## 2017-03-06 RX ORDER — LIDOCAINE HYDROCHLORIDE 10 MG/ML
20 INJECTION, SOLUTION EPIDURAL; INFILTRATION; INTRACAUDAL; PERINEURAL AS NEEDED
Status: DISCONTINUED | OUTPATIENT
Start: 2017-03-06 | End: 2017-03-07 | Stop reason: HOSPADM

## 2017-03-06 RX ORDER — OXYTOCIN IN 5 % DEXTROSE 30/500 ML
2-20 PLASTIC BAG, INJECTION (ML) INTRAVENOUS
Status: DISCONTINUED | OUTPATIENT
Start: 2017-03-06 | End: 2017-03-07

## 2017-03-06 RX ORDER — OXYTOCIN/RINGER'S LACTATE 20/1000 ML
500 PLASTIC BAG, INJECTION (ML) INTRAVENOUS ONCE
Status: ACTIVE | OUTPATIENT
Start: 2017-03-06 | End: 2017-03-06

## 2017-03-06 RX ORDER — TERBUTALINE SULFATE 1 MG/ML
0.25 INJECTION SUBCUTANEOUS
Status: DISCONTINUED | OUTPATIENT
Start: 2017-03-06 | End: 2017-03-07 | Stop reason: HOSPADM

## 2017-03-06 RX ORDER — LIDOCAINE HYDROCHLORIDE AND EPINEPHRINE 15; 5 MG/ML; UG/ML
INJECTION, SOLUTION EPIDURAL AS NEEDED
Status: DISCONTINUED | OUTPATIENT
Start: 2017-03-06 | End: 2017-03-07 | Stop reason: HOSPADM

## 2017-03-06 RX ORDER — SODIUM CHLORIDE, SODIUM LACTATE, POTASSIUM CHLORIDE, CALCIUM CHLORIDE 600; 310; 30; 20 MG/100ML; MG/100ML; MG/100ML; MG/100ML
125 INJECTION, SOLUTION INTRAVENOUS CONTINUOUS
Status: DISCONTINUED | OUTPATIENT
Start: 2017-03-06 | End: 2017-03-07 | Stop reason: HOSPADM

## 2017-03-06 RX ORDER — BUPIVACAINE HYDROCHLORIDE 2.5 MG/ML
INJECTION, SOLUTION EPIDURAL; INFILTRATION; INTRACAUDAL AS NEEDED
Status: DISCONTINUED | OUTPATIENT
Start: 2017-03-06 | End: 2017-03-07 | Stop reason: HOSPADM

## 2017-03-06 RX ORDER — TERBUTALINE SULFATE 1 MG/ML
0.25 INJECTION SUBCUTANEOUS
Status: DISCONTINUED | OUTPATIENT
Start: 2017-03-06 | End: 2017-03-06

## 2017-03-06 RX ORDER — NALBUPHINE HYDROCHLORIDE 10 MG/ML
10 INJECTION, SOLUTION INTRAMUSCULAR; INTRAVENOUS; SUBCUTANEOUS
Status: DISCONTINUED | OUTPATIENT
Start: 2017-03-06 | End: 2017-03-07 | Stop reason: HOSPADM

## 2017-03-06 RX ORDER — METHYLERGONOVINE MALEATE 0.2 MG/ML
0.2 INJECTION INTRAVENOUS AS NEEDED
Status: DISCONTINUED | OUTPATIENT
Start: 2017-03-06 | End: 2017-03-07 | Stop reason: HOSPADM

## 2017-03-06 RX ORDER — OXYTOCIN IN 5 % DEXTROSE 30/500 ML
PLASTIC BAG, INJECTION (ML) INTRAVENOUS
Status: COMPLETED
Start: 2017-03-06 | End: 2017-03-06

## 2017-03-06 RX ORDER — HYDROMORPHONE HYDROCHLORIDE 1 MG/ML
1 INJECTION, SOLUTION INTRAMUSCULAR; INTRAVENOUS; SUBCUTANEOUS
Status: DISCONTINUED | OUTPATIENT
Start: 2017-03-06 | End: 2017-03-07 | Stop reason: HOSPADM

## 2017-03-06 RX ORDER — SODIUM CHLORIDE 0.9 % (FLUSH) 0.9 %
5-10 SYRINGE (ML) INJECTION AS NEEDED
Status: DISCONTINUED | OUTPATIENT
Start: 2017-03-06 | End: 2017-03-07 | Stop reason: HOSPADM

## 2017-03-06 RX ADMIN — SODIUM CHLORIDE, SODIUM LACTATE, POTASSIUM CHLORIDE, AND CALCIUM CHLORIDE 125 ML/HR: 600; 310; 30; 20 INJECTION, SOLUTION INTRAVENOUS at 09:00

## 2017-03-06 RX ADMIN — SODIUM CHLORIDE, SODIUM LACTATE, POTASSIUM CHLORIDE, AND CALCIUM CHLORIDE 1000 ML: 600; 310; 30; 20 INJECTION, SOLUTION INTRAVENOUS at 16:30

## 2017-03-06 RX ADMIN — Medication 2 MILLI-UNITS/MIN: at 11:29

## 2017-03-06 RX ADMIN — SODIUM CHLORIDE, SODIUM LACTATE, POTASSIUM CHLORIDE, AND CALCIUM CHLORIDE 125 ML/HR: 600; 310; 30; 20 INJECTION, SOLUTION INTRAVENOUS at 19:30

## 2017-03-06 RX ADMIN — SODIUM CHLORIDE, SODIUM LACTATE, POTASSIUM CHLORIDE, AND CALCIUM CHLORIDE 125 ML/HR: 600; 310; 30; 20 INJECTION, SOLUTION INTRAVENOUS at 16:13

## 2017-03-06 RX ADMIN — Medication 10 ML/HR: at 17:59

## 2017-03-06 RX ADMIN — BUPIVACAINE HYDROCHLORIDE 8 ML: 2.5 INJECTION, SOLUTION EPIDURAL; INFILTRATION; INTRACAUDAL at 17:55

## 2017-03-06 RX ADMIN — LIDOCAINE HYDROCHLORIDE AND EPINEPHRINE 3 ML: 15; 5 INJECTION, SOLUTION EPIDURAL at 17:57

## 2017-03-06 RX ADMIN — FENTANYL CITRATE 100 MCG: 50 INJECTION, SOLUTION INTRAMUSCULAR; INTRAVENOUS at 17:58

## 2017-03-06 RX ADMIN — SODIUM CHLORIDE, SODIUM LACTATE, POTASSIUM CHLORIDE, AND CALCIUM CHLORIDE 125 ML/HR: 600; 310; 30; 20 INJECTION, SOLUTION INTRAVENOUS at 17:39

## 2017-03-06 NOTE — IP AVS SNAPSHOT
Summary of Care Report The Summary of Care report has been created to help improve care coordination. Users with access to [employer] or [address] (Web-based application) may access additional patient information including the Discharge Summary. If you are not currently a [address] user and need more information, please call the number listed below in the [place] [address] section and ask to be connected with Medical Records. Facility Information Name Address Phone [facility] [address] [facility] [phone] [phone] Patient Information Patient Name Sex  Silvia Stands ([id number]) Female 1980 Discharge Information Admitting Provider Service Area Unit Johnny Sanabriakiran, DO / [address] Mother Baby Unit / [phone] Discharge Provider Discharge Date/Time Discharge Disposition Destination (none) 3/8/2017 (Pending) AHR (none) Patient Language Language ENGLISH [13] Problem List as of 3/8/2017  Date Reviewed: 2/15/2017 Codes Priority Class Noted - Resolved RESOLVED: Female hirsutism ICD-10-CM: L68.0 ICD-9-CM: 704.1   2009 - 2010 RESOLVED: Thyroid disease ICD-10-CM: E07.9 ICD-9-CM: 246. 9   Unknown - 2010 Overview Addendum 2010 10:22 PM by Nelly Walters  
  2009 sl increased T 4 free level RESOLVED: Well female exam ICD-9-CM: V70.0   Unknown - 2010 Social Anxiety disorder w Agoraphobia ICD-10-CM: F40.00 ICD-9-CM: 300.22   2009 - Present Overview Signed 2010 10:21 PM by Nelly HIDALGO Hx of Depression Bipolar 1 disorder (HCC) ICD-10-CM: F31.9 ICD-9-CM: 296.7   2016 - Present Advanced maternal age in multigravida ICD-10-CM: O09.529 ICD-9-CM: 659.60   2016 - Present Post term pregnancy ICD-10-CM: O48.0 ICD-9-CM: 645.10   3/6/2017 - Present You are allergic to the following No active allergies Current Discharge Medication List  
  
START taking these medications Dose & Instructions Dispensing Information Comments  
 ibuprofen 800 mg tablet Commonly known as:  MOTRIN Dose:  800 mg Take 1 Tab by mouth every eight (8) hours as needed. Quantity:  30 Tab Refills:  0 CONTINUE these medications which have NOT CHANGED Dose & Instructions Dispensing Information Comments  
 gabapentin 300 mg capsule Commonly known as:  NEURONTIN Dose:  300 mg Take 1 Cap by mouth three (3) times daily. Quantity:  90 Cap Refills:  0  
   
 lamoTRIgine 100 mg tablet Commonly known as: LaMICtal  
 Take 3 tabs by mouth daily. Quantity:  90 Tab Refills:  6  
   
 nicotine 2 mg gum Commonly known as:  Tim Buchanan Dose:  4 mg Take 2 Each by mouth every two (2) hours as needed for Smoking Cessation (PtDeni streeter) for up to 30 days. Indications: SMOKING CESSATION Quantity:  100 Each Refills:  3  
   
 pnv w/o calcium-iron fum-fa 27-1 mg Tab Dose:  1 Tab Take 1 Tab by mouth daily. Quantity:  90 Tab Refills:  prn  
   
  
  
  
Current Immunizations Name Date Influenza Vaccine (Quad) PF 1/13/2017 Tdap 1/13/2017 Surgery Information ID Date/Time Status Primary Surgeon All Procedures Location [id number] 3/6/2017 [address] [place] [address] - DO NOT SCHEDULE Follow-up Information Follow up With Details Comments Contact Juan Cad[place] DO   [address] [facility] [address] [place] [address]/[address] [facility] [phone] Discharge Instructions None Chart Review Routing History No Routing History on File

## 2017-03-06 NOTE — ANESTHESIA PROCEDURE NOTES
Epidural Block    Start time: 3/6/2017 5:46 PM  End time: 3/6/2017 6:15 PM  Performed by: Karina Hardy  Authorized by: Terri Coley     Pre-Procedure  Indication: labor epidural    Preanesthetic Checklist: patient identified, risks and benefits discussed, anesthesia consent, site marked, patient being monitored, timeout performed and anesthesia consent    Timeout Time: 17:49        Epidural:   Patient position:  Seated  Prep region:  Lumbar  Prep: Betadine and Patient draped    Location:  L3-4    Needle and Epidural Catheter:   Needle Type:  Tuohy  Needle Gauge:  18 G  Injection Technique:  Loss of resistance using air  Attempts:  1  Catheter at Skin Depth (cm):  9  Depth in Epidural Space (cm):  4  Events: no blood with aspiration, no cerebrospinal fluid with aspiration, no paresthesia and negative aspiration test    Test Dose:  Lidocaine 1.5% w/ epi and negative    Assessment:   Catheter Secured:  Tegaderm and tape  Insertion:  Uncomplicated  Patient tolerance:  Patient tolerated the procedure well with no immediate complications  059 mcg Fentanyl given via Epidural cath.

## 2017-03-06 NOTE — PROGRESS NOTES
Pt up to bathroom to void  Ramires bulb passed in toilet  Small amount vag bleeding noted   Back to bed efm on

## 2017-03-06 NOTE — PROGRESS NOTES
Pt arrived ambulatory from home for scheduled induction of labor for postdates. Pt denies vaginal bleeding, leakage of fluid, epigastric pain. Patient plans for epidural management of pain.

## 2017-03-06 NOTE — H&P
History & Physical    Name: Kleber Villa MRN: [id number]  SSN: [id number]    YOB: 1980  Age: 39 y.o. Sex: female        Subjective:     Estimated Date of Delivery: 17  OB History      Para Term  AB TAB SAB Ectopic Multiple Living    4 3 3       3          Ms. Ian Fuentes is admitted with pregnancy at 41w2d for induction of labor. Prenatal course was complicated by advanced maternal age and anxiety disorder. Please see prenatal records for details. Past Medical History:   Diagnosis Date    Chlamydia 2016    Depression     Female hirsutism 2009    Gonorrhea     Social Anxiety disorder w Agoraphobia 2009    Thyroid activity decreased     Trauma     Well female exam     GYN : Dr Jorge Valderrama     No past surgical history on file. Social History     Occupational History    Not on file. Social History Main Topics    Smoking status: Current Every Day Smoker     Packs/day: 0.25     Years: 25.00    Smokeless tobacco: Never Used      Comment: using nicorette gum    Alcohol use No    Drug use: No    Sexual activity: Yes     Partners: Male     Family History   Problem Relation Age of Onset    Hypertension Mother     Bipolar Disorder Mother     No Known Problems Father        No Known Allergies  Prior to Admission medications    Medication Sig Start Date End Date Taking? Authorizing Provider   nicotine (NICORETTE) 2 mg gum Take 2 Each by mouth every two (2) hours as needed for Smoking Cessation (PtDeni streeter) for up to 30 days. Indications: SMOKING CESSATION 2/15/17 3/17/17 Yes Meghan Martin MD   pnv w/o calcium-iron fum-fa 27-1 mg tab Take 1 Tab by mouth daily. 16  Yes Baljinder Gr, DO   lamoTRIgine (LAMICTAL) 100 mg tablet Take 3 tabs by mouth daily. 8/10/16  Yes Baljinder Gr, DO   gabapentin (NEURONTIN) 300 mg capsule Take 1 Cap by mouth three (3) times daily.  17   Mac Lehman,         Review of Systems: Constitutional: negative for fevers, and chills  Respiratory: negative for cough, pleurisy/chest pain, pneumonia or wheezing  Cardiovascular: negative for chest pain, dyspnea, palpitations, irregular heart beats  Gastrointestinal: negative for nausea, vomiting and diarrhea  Genitourinary:negative for dysuria and hematuria      Objective:     Vitals:  Vitals:    03/06/17 0758   Temp: 97.5 °F (36.4 °C)        Physical Exam:  Patient without distress. Abdomen: soft, nontender  Fundus: soft and non tender  Perineum: blood absent, amniotic fluid absent  Cervical Exam: 1-2/60/-3  Lower Extremities:  - Edema 1+   - No evidence of DVT seen on physical exam.   - Patellar Reflexes: 1+ bilaterally  Membranes:  Intact  Fetal Heart Rate: Cat I  Uterine contractions: quiet    Prenatal Labs:   Lab Results   Component Value Date/Time    GrBStrep, External neg 01/27/2017         Assessment/Plan:     Plan: Admit for induction of labor. Ramires bulb placement. Continue plan for vaginal delivery. Group B Strep was negative.     Signed By:  Olaf Soria DO     March 6, 2017

## 2017-03-06 NOTE — PROGRESS NOTES
Labor Progress Note  Patient seen, fetal heart rate and contraction pattern evaluated, patient examined. Patient Vitals for the past 1 hrs:   BP Temp Pulse   03/06/17 1716 127/79 - 69   03/06/17 1701 120/71 97.7 °F (36.5 °C) 64   03/06/17 1646 125/72 - 67       Physical Exam:  Cervical Exam:  4/80/-3  Membranes:  Artificial Rupture of Membranes; Amniotic Fluid: small amount of clear fluid  Uterine Activity: Frequency: Every 2-3 minutes  Fetal Heart Rate: Cat I    Assessment/Plan:  IOL for postdates, s/p bowles bulb.   Continue pitocin augmentation, Continue plan for vaginal delivery

## 2017-03-06 NOTE — IP AVS SNAPSHOT
[address] Patient: Pancho Rivas MRN: [id number] ZCO:3/76/0011 You are allergic to the following No active allergies Recent Documentation Breastfeeding? OB Status Smoking Status Yes Recent pregnancy Current Every Day Smoker Emergency Contacts Name Discharge Info Relation Home Work Mobile Jony Springer  Mother [[id number]]   [phone] About your hospitalization You were admitted on:  March 6, 2017 You last received care in the:  [place] [address] You were discharged on:  March 8, 2017 Unit phone number:  [phone] Why you were hospitalized Your primary diagnosis was:  Not on File Your diagnoses also included:  Post Term Pregnancy Providers Seen During Your Hospitalizations Provider Role Specialty Primary office phone Tiffany Hough DO Attending Provider Obstetrics & Gynecology [phone] Your Primary Care Physician (PCP) Primary Care Physician Office Phone Office Fax Nakul Man [phone] Follow-up Information Follow up With Details Comments Contact Info Martin Bowen DO   [id number] [place] [address] [phone] Current Discharge Medication List  
  
START taking these medications Dose & Instructions Dispensing Information Comments Morning Noon Evening Bedtime  
 ibuprofen 800 mg tablet Commonly known as:  MOTRIN Your next dose is: Today, Tomorrow Other:  _________ Dose:  800 mg Take 1 Tab by mouth every eight (8) hours as needed. Quantity:  30 Tab Refills:  0 CONTINUE these medications which have NOT CHANGED Dose & Instructions Dispensing Information Comments Morning Noon Evening Bedtime  
 gabapentin 300 mg capsule Commonly known as:  NEURONTIN Your next dose is: Today, Tomorrow Other:  _________ Dose:  300 mg Take 1 Cap by mouth three (3) times daily. Quantity:  90 Cap Refills:  0  
     
   
   
   
  
 lamoTRIgine 100 mg tablet Commonly known as: LaMICtal  
   
Your next dose is: Today, Tomorrow Other:  _________ Take 3 tabs by mouth daily. Quantity:  90 Tab Refills:  6  
     
   
   
   
  
 nicotine 2 mg gum Commonly known as:  Pallavi Aries Your next dose is: Today, Tomorrow Other:  _________ Dose:  4 mg Take 2 Each by mouth every two (2) hours as needed for Smoking Cessation (PtDeni streeter) for up to 30 days. Indications: SMOKING CESSATION Quantity:  100 Each Refills:  3  
     
   
   
   
  
 pnv w/o calcium-iron fum-fa 27-1 mg Tab Your next dose is: Today, Tomorrow Other:  _________ Dose:  1 Tab Take 1 Tab by mouth daily. Quantity:  90 Tab Refills:  prn Where to Get Your Medications Information on where to get these meds will be given to you by the nurse or doctor. ! Ask your nurse or doctor about these medications  
  ibuprofen 800 mg tablet Discharge Instructions None Discharge Instructions Attachments/References POSTPARTUM (ENGLISH) Discharge Orders None [place] Announcement We are excited to announce that we are making your provider's discharge notes available to you in [facility]. You will see these notes when they are completed and signed by the physician that discharged you from your recent hospital stay. If you have any questions or concerns about any information you see in [facility], please call the Health Information Department where you were seen or reach out to your Primary Care Provider for more information about your plan of care. Introducing [facility]! Osmar Steve introduces [employer] patient portal. Now you can access parts of your medical record, email your doctor's office, and request medication refills online. 1. In your internet browser, go to [email or web address] [email or web address] 2. Click on the First Time User? Click Here link in the Sign In box. You will see the New Member Sign Up page. 3. Enter your [employer] Access Code exactly as it appears below. You will not need to use this code after youve completed the sign-up process. If you do not sign up before the expiration date, you must request a new code. · [employer] Access Code: [email or web address] Expires: 4/27/2017  2:04 PM 
 
4. Enter the last four digits of your Social Security Number (xxxx) and Date of Birth (mm/dd/yyyy) as indicated and click Submit. You will be taken to the next sign-up page. 5. Create a [employer] ID. This will be your [employer] login ID and cannot be changed, so think of one that is secure and easy to remember. 6. Create a [employer] password. You can change your password at any time. 7. Enter your Password Reset Question and Answer. This can be used at a later time if you forget your password. 8. Enter your e-mail address. You will receive e-mail notification when new information is available in [address] 9. Click Sign Up. You can now view and download portions of your medical record. 10. Click the Download Summary menu link to download a portable copy of your medical information. If you have questions, please visit the Frequently Asked Questions section of the [employer] website. Remember, [employer] is NOT to be used for urgent needs. For medical emergencies, dial 911. Now available from your iPhone and Android! General Information Please provide this summary of care documentation to your next provider. Patient Signature:  ____________________________________________________________ Date:  ____________________________________________________________  
  
Giovani Gene Provider Signature:  ____________________________________________________________ Date:  ____________________________________________________________ More Information After Your Delivery (the Postpartum Period): Care Instructions Your Care Instructions Congratulations on the birth of your baby. Like pregnancy, the  period can be a time of excitement, zenaida, and exhaustion. You may look at your wondrous little baby and feel happy. You may also be overwhelmed by your new sleep hours and new responsibilities. At first, babies often sleep during the days and are awake at night. They do not have a pattern or routine. They may make sudden gasps, jerk themselves awake, or look like they have crossed eyes. These are all normal, and they may even make you smile. In these first weeks after delivery, try to take good care of yourself. It may take 4 to 6 weeks to feel like yourself again, and possibly longer if you had a  birth. You will likely feel very tired for several weeks. Your days will be full of ups and downs, but lots of zenaida as well. Follow-up care is a key part of your treatment and safety. Be sure to make and go to all appointments, and call your doctor if you are having problems. It's also a good idea to know your test results and keep a list of the medicines you take. How can you care for yourself at home? Take care of your body after delivery · Use pads instead of tampons for the bloody flow that may last as long as 2 weeks. · Ease cramps with ibuprofen (Advil, Motrin). · Ease soreness of hemorrhoids and the area between your vagina and rectum with ice compresses or witch hazel pads. · Ease constipation by drinking lots of fluid and eating high-fiber foods. Ask your doctor about over-the-counter stool softeners.  
· Cleanse yourself with a gentle squeeze of warm water from a bottle instead of wiping with toilet paper. · Take a sitz bath in warm water several times a day. · Wear a good nursing bra. Ease sore and swollen breasts with warm, wet washcloths. · If you are not breastfeeding, use ice rather than heat for breast soreness. · Your period may not start for several months if you are breastfeeding. You may bleed more, and longer at first, than you did before you got pregnant. · Wait until you are healed (about 4 to 6 weeks) before you have sexual intercourse. Your doctor will tell you when it is okay to have sex. · Try not to travel with your baby for 5 or 6 weeks. If you take a long car trip, make frequent stops to walk around and stretch. Avoid exhaustion · Rest every day. Try to nap when your baby naps. · Ask another adult to be with you for a few days after delivery. · Plan for  if you have other children. · Stay flexible so you can eat at odd hours and sleep when you need to. Both you and your baby are making new schedules. · Plan small trips to get out of the house. Change can make you feel less tired. · Ask for help with housework, cooking, and shopping. Remind yourself that your job is to care for your baby. Know about help for postpartum depression · \"Baby blues\" are common for the first 1 to 2 weeks after birth. You may cry or feel sad or irritable for no reason. · Rest whenever you can. Being tired makes it harder to handle your emotions. · Go for walks with your baby. · Talk to your partner, friends, and family about your feelings. · If your symptoms last for more than a few weeks, or if you feel very depressed, ask your doctor for help. · Postpartum depression can be treated. Support groups and counseling can help. Sometimes medicine can also help. Stay healthy · Eat healthy foods so you have more energy, make good breast milk, and lose extra baby pounds. · If you breastfeed, avoid alcohol and drugs. Stay smoke-free.  If you quit during pregnancy, congratulations. · Start daily exercise after 4 to 6 weeks, but rest when you feel tired. · Learn exercises to tone your belly. Do Kegel exercises to regain strength in your pelvic muscles. You can do these exercises while you stand or sit. ¨ Squeeze the same muscles you would use to stop your urine. Your belly and thighs should not move. ¨ Hold the squeeze for 3 seconds, and then relax for 3 seconds. ¨ Start with 3 seconds. Then add 1 second each week until you are able to squeeze for 10 seconds. ¨ Repeat the exercise 10 to 15 times for each session. Do three or more sessions each day. · Find a class for new mothers and new babies that has an exercise time. · If you had a  birth, give yourself a bit more time before you exercise, and be careful. When should you call for help? Call 911 anytime you think you may need emergency care. For example, call if: 
· You passed out (lost consciousness). Call your doctor now or seek immediate medical care if: 
· You have severe vaginal bleeding. This means you are passing blood clots and soaking through a pad each hour for 2 or more hours. · You are dizzy or lightheaded, or you feel like you may faint. · You have a fever. · You have new belly pain, or your pain gets worse. Watch closely for changes in your health, and be sure to contact your doctor if: 
· Your vaginal bleeding seems to be getting heavier. · You have new or worse vaginal discharge. · You feel sad, anxious, or hopeless for more than a few days. · You do not get better as expected. Where can you learn more? Go to http://leticia-steffi.info/. Enter A461 in the search box to learn more about \"After Your Delivery (the Postpartum Period): Care Instructions. \" Current as of: May 30, 2016 Content Version: 11.1 © 6742-5334 [employer].  Care instructions adapted under license by [employer] (which disclaims liability or warranty for this information). If you have questions about a medical condition or this instruction, always ask your healthcare professional. [place] [address] any warranty or liability for your use of this information.

## 2017-03-06 NOTE — ANESTHESIA PREPROCEDURE EVALUATION
Anesthetic History   No history of anesthetic complications            Review of Systems / Medical History  Patient summary reviewed, nursing notes reviewed and pertinent labs reviewed    Pulmonary  Within defined limits                 Neuro/Psych   Within defined limits           Cardiovascular  Within defined limits                     GI/Hepatic/Renal  Within defined limits              Endo/Other  Within defined limits           Other Findings              Physical Exam    Airway  Mallampati: II  TM Distance: 4 - 6 cm  Neck ROM: normal range of motion   Mouth opening: Normal     Cardiovascular  Regular rate and rhythm,  S1 and S2 normal,  no murmur, click, rub, or gallop  Rhythm: regular  Rate: normal         Dental  No notable dental hx       Pulmonary  Breath sounds clear to auscultation               Abdominal  Abdominal exam normal       Other Findings            Anesthetic Plan    ASA: 2  Anesthesia type: epidural      Post-op pain plan if not by surgeon: indwelling epidural catheter      Anesthetic plan and risks discussed with: Patient

## 2017-03-06 NOTE — IP AVS SNAPSHOT
Current Discharge Medication List  
  
Take these medications at their scheduled times Dose & Instructions Dispensing Information Comments Morning Noon Evening Bedtime  
 gabapentin 300 mg capsule Commonly known as:  NEURONTIN Your next dose is: Today, Tomorrow Other:  ____________ Dose:  300 mg Take 1 Cap by mouth three (3) times daily. Quantity:  90 Cap Refills:  0  
     
   
   
   
  
 pnv w/o calcium-iron fum-fa 27-1 mg Tab Your next dose is: Today, Tomorrow Other:  ____________ Dose:  1 Tab Take 1 Tab by mouth daily. Quantity:  90 Tab Refills:  prn Take these medications as needed Dose & Instructions Dispensing Information Comments Morning Noon Evening Bedtime  
 ibuprofen 800 mg tablet Commonly known as:  MOTRIN Your next dose is: Today, Tomorrow Other:  ____________ Dose:  800 mg Take 1 Tab by mouth every eight (8) hours as needed. Quantity:  30 Tab Refills:  0  
     
   
   
   
  
 nicotine 2 mg gum Commonly known as:  Muriel Crutch Your next dose is: Today, Tomorrow Other:  ____________ Dose:  4 mg Take 2 Each by mouth every two (2) hours as needed for Smoking Cessation (Pt. yaquelin streeter) for up to 30 days. Indications: SMOKING CESSATION Quantity:  100 Each Refills:  3 Take these medications as directed Dose & Instructions Dispensing Information Comments Morning Noon Evening Bedtime  
 lamoTRIgine 100 mg tablet Commonly known as: LaMICtal  
   
Your next dose is: Today, Tomorrow Other:  ____________ Take 3 tabs by mouth daily. Quantity:  90 Tab Refills:  6 Where to Get Your Medications Information about where to get these medications is not yet available ! Ask your nurse or doctor about these medications ibuprofen 800 mg tablet

## 2017-03-07 PROCEDURE — 75410000003 HC RECOV DEL/VAG/CSECN EA 0.5 HR

## 2017-03-07 PROCEDURE — 0HQ9XZZ REPAIR PERINEUM SKIN, EXTERNAL APPROACH: ICD-10-PCS | Performed by: OBSTETRICS & GYNECOLOGY

## 2017-03-07 PROCEDURE — 76060000078 HC EPIDURAL ANESTHESIA

## 2017-03-07 PROCEDURE — 75410000002 HC LABOR FEE PER 1 HR

## 2017-03-07 PROCEDURE — 77010026065 HC OXYGEN MINIMUM MEDICAL AIR

## 2017-03-07 PROCEDURE — 65270000029 HC RM PRIVATE

## 2017-03-07 PROCEDURE — 75410000000 HC DELIVERY VAGINAL/SINGLE

## 2017-03-07 PROCEDURE — 74011250636 HC RX REV CODE- 250/636: Performed by: OBSTETRICS & GYNECOLOGY

## 2017-03-07 PROCEDURE — 4A1HXCZ MONITORING OF PRODUCTS OF CONCEPTION, CARDIAC RATE, EXTERNAL APPROACH: ICD-10-PCS | Performed by: OBSTETRICS & GYNECOLOGY

## 2017-03-07 PROCEDURE — 74011250637 HC RX REV CODE- 250/637: Performed by: OBSTETRICS & GYNECOLOGY

## 2017-03-07 PROCEDURE — 74011000250 HC RX REV CODE- 250: Performed by: NURSE ANESTHETIST, CERTIFIED REGISTERED

## 2017-03-07 RX ORDER — IBUPROFEN 400 MG/1
800 TABLET ORAL
Status: DISCONTINUED | OUTPATIENT
Start: 2017-03-07 | End: 2017-03-09 | Stop reason: HOSPADM

## 2017-03-07 RX ORDER — PROMETHAZINE HYDROCHLORIDE 25 MG/ML
25 INJECTION, SOLUTION INTRAMUSCULAR; INTRAVENOUS
Status: DISCONTINUED | OUTPATIENT
Start: 2017-03-07 | End: 2017-03-09 | Stop reason: HOSPADM

## 2017-03-07 RX ORDER — AMOXICILLIN 250 MG
1 CAPSULE ORAL
Status: DISCONTINUED | OUTPATIENT
Start: 2017-03-07 | End: 2017-03-09 | Stop reason: HOSPADM

## 2017-03-07 RX ORDER — OXYCODONE AND ACETAMINOPHEN 5; 325 MG/1; MG/1
2 TABLET ORAL
Status: DISCONTINUED | OUTPATIENT
Start: 2017-03-07 | End: 2017-03-09 | Stop reason: HOSPADM

## 2017-03-07 RX ORDER — ZOLPIDEM TARTRATE 5 MG/1
5 TABLET ORAL
Status: DISCONTINUED | OUTPATIENT
Start: 2017-03-07 | End: 2017-03-09 | Stop reason: HOSPADM

## 2017-03-07 RX ORDER — ACETAMINOPHEN 325 MG/1
650 TABLET ORAL
Status: DISCONTINUED | OUTPATIENT
Start: 2017-03-07 | End: 2017-03-09 | Stop reason: HOSPADM

## 2017-03-07 RX ADMIN — Medication 125 ML/HR: at 03:17

## 2017-03-07 RX ADMIN — Medication 10 ML/HR: at 02:10

## 2017-03-07 RX ADMIN — OXYCODONE HYDROCHLORIDE AND ACETAMINOPHEN 2 TABLET: 5; 325 TABLET ORAL at 20:52

## 2017-03-07 RX ADMIN — IBUPROFEN 800 MG: 400 TABLET, FILM COATED ORAL at 17:11

## 2017-03-07 RX ADMIN — IBUPROFEN 800 MG: 400 TABLET, FILM COATED ORAL at 09:00

## 2017-03-07 NOTE — ROUTINE PROCESS
0720--Bedside and Verbal shift change report given to Christian Sue RN (oncoming nurse) by Grover Marcial RN (offgoing nurse). Report included the following information SBAR, Kardex, Intake/Output, MAR and Recent Results.

## 2017-03-07 NOTE — PROGRESS NOTES
2023- efm applied- IUP no longer tracing  Dr Abdon Sharma in room, sve done, pt turned to R side with peanut ball

## 2017-03-07 NOTE — PROGRESS NOTES
Pt up to bathroom, unable to lift R leg off bed but can move around in bed. Pt able to ambulate with assistance to the bathroom and voided x 1. Nani care done. Pt herbert well.

## 2017-03-07 NOTE — ANESTHESIA POSTPROCEDURE EVALUATION
Post-Anesthesia Evaluation and Assessment    Patient: Yusra Plummer MRN: [id number]  SSN: [id number]    YOB: 1980  Age: 39 y.o. Sex: female       Cardiovascular Function/Vital Signs  Visit Vitals    /62    Pulse 76    Temp 36.8 °C (98.2 °F)    Resp 18    SpO2 100%    Breastfeeding Yes       Patient is status post epidural anesthesia for * No procedures listed *. Nausea/Vomiting: None    Postoperative hydration reviewed and adequate. Pain:  Pain Scale 1: Numeric (0 - 10) (03/07/17 0450)  Pain Intensity 1: 0 (03/07/17 0450)   Managed    Neurological Status:   Neuro (WDL): Within Defined Limits (03/06/17 1920)   At baseline    Mental Status and Level of Consciousness: Alert and oriented     Pulmonary Status:   O2 Device: Room air (03/06/17 1920)   Adequate oxygenation and airway patent    Complications related to anesthesia: None    Post-anesthesia assessment completed.  No concerns    Signed By: Jame Ortiz CRNA     March 7, 2017

## 2017-03-07 NOTE — L&D DELIVERY NOTE
Delivery Note    Obstetrician:  Tiffany Hough DO    Pre-Delivery Diagnosis: Term pregnancy at 41w3d, Induced labor, Single fetus or Pregnancy complicated by: AMA    Post-Delivery Diagnosis: Living  infant(s) or Male    Intrapartum Event: Multiple variable decelerations    Procedure: Spontaneous vaginal delivery    Epidural: YES    Monitor:  Fetal Heart Tones - External and Uterine Contractions - External    Indications for instrumental delivery: none    Estimated Blood Loss: 300cc    Episiotomy: none    Laceration(s):  1st degree    Laceration(s) repair: YES    Presentation: Cephalic    Fetal Description: willis    Fetal Position: Right Occiput Anterior    Birth Weight: 3820g    Birth Length: 49.5 cm    Apgar - One Minute: 8    Apgar - Five Minutes: 9    Umbilical Cord: Cord blood sent to lab for type, Rh, and Steffanie' test    Specimens: none           Complications:  none           Cord Blood Results:   Information for the patient's :  Deisy Alejo [[id number]]   No results found for: PCTABR, PCTDIG, BILI, ABORH    Prenatal Labs:     Lab Results   Component Value Date/Time    ABO/Rh(D) O POSITIVE 2017 09:20 AM    HBsAg, External negative 2016    HIV, External negative 2016    Rubella, External immune 2016    RPR, External non reactive 2016    Gonorrhea, External negative 2016    Chlamydia, External negative 2017    GrBStrep, External neg 2017        Attending Attestation: I was present and scrubbed for the entire procedure    Signed By:  Tiffany Hough DO     2017

## 2017-03-07 NOTE — PROGRESS NOTES
Bedside and Verbal shift change report given to Jonathan (oncoming nurse) by Robin Franz RN (offgoing nurse). Report included the following information SBAR, Kardex, Procedure Summary, Intake/Output, MAR, Recent Results and Med Rec Status. 1921 pt was supine,  pt turned to her L side as her R side is very numb, baby with fhr decel to 65  turned to R side, O2 @ 10 L NRB applied.

## 2017-03-07 NOTE — ROUTINE PROCESS
Mother doing well. Up without complaints. Voiding without problems. Pain managed well with motrin. Educated patient on normal bleeding and when to notify nurse. Fundus firm, bleeding small. Bonding well with baby.

## 2017-03-07 NOTE — LACTATION NOTE
Mom states baby is nursing great. Experienced mom. Encouraged to ask for help as needed. Info sheet and daily log given. 16:10 - Mom asked for help. Mom actually only pumped for other children. Helped position and latch baby. He is nursing well, strong suck. Discussed suck, latch, nursing pattern, colostrum, size of stomach, wet/dirty diapers. Encouraged to ask for help as needed.

## 2017-03-07 NOTE — PROGRESS NOTES
Labor Progress Note  Patient seen, fetal heart rate and contraction pattern evaluated, patient examined. No data found. Physical Exam:  Cervical Exam:  /  Uterine Activity: Frequency: Every 3-4 minutes  Fetal Heart Rate: Cat II when Pitocin restarted, moderate variability    Assessment/Plan:  Labor  Not progressing normally. Patient has been ~8 cm x 5 hours. However patient not tolerating Pitocin augmentation. If no cervical change at next check, will need a  delivery. I have verbalized the plan of care with patient. The patient was given a full opportunity to ask questions and indicated that her questions had been answered.

## 2017-03-07 NOTE — PROGRESS NOTES
Labor Progress Note  Patient seen, fetal heart rate and contraction pattern evaluated, patient examined. No data found. Physical Exam:  Cervical Exam:  4/50/-3  Uterine Activity: Frequency: Every 3-4 minutes  Fetal Heart Rate: Cat II, variable decels noted, +accels, mod abeba    Assessment/Plan:  IUPC/IFM placed due to difficulty tracing.  Cont pitocin augmentation, Continue plan for vaginal delivery

## 2017-03-08 VITALS
TEMPERATURE: 98.1 F | DIASTOLIC BLOOD PRESSURE: 72 MMHG | HEART RATE: 82 BPM | SYSTOLIC BLOOD PRESSURE: 114 MMHG | OXYGEN SATURATION: 100 % | RESPIRATION RATE: 16 BRPM

## 2017-03-08 LAB
HCT VFR BLD AUTO: 36.3 % (ref 35–45)
HGB BLD-MCNC: 12 G/DL (ref 12–16)

## 2017-03-08 PROCEDURE — 85018 HEMOGLOBIN: CPT | Performed by: OBSTETRICS & GYNECOLOGY

## 2017-03-08 PROCEDURE — 36415 COLL VENOUS BLD VENIPUNCTURE: CPT | Performed by: OBSTETRICS & GYNECOLOGY

## 2017-03-08 PROCEDURE — 85014 HEMATOCRIT: CPT | Performed by: OBSTETRICS & GYNECOLOGY

## 2017-03-08 PROCEDURE — 74011250637 HC RX REV CODE- 250/637: Performed by: OBSTETRICS & GYNECOLOGY

## 2017-03-08 RX ORDER — IBUPROFEN 800 MG/1
800 TABLET ORAL
Qty: 30 TAB | Refills: 0 | Status: SHIPPED | OUTPATIENT
Start: 2017-03-08 | End: 2017-05-16

## 2017-03-08 RX ADMIN — OXYCODONE HYDROCHLORIDE AND ACETAMINOPHEN 2 TABLET: 5; 325 TABLET ORAL at 14:05

## 2017-03-08 RX ADMIN — OXYCODONE HYDROCHLORIDE AND ACETAMINOPHEN 2 TABLET: 5; 325 TABLET ORAL at 08:13

## 2017-03-08 RX ADMIN — OXYCODONE HYDROCHLORIDE AND ACETAMINOPHEN 2 TABLET: 5; 325 TABLET ORAL at 01:20

## 2017-03-08 RX ADMIN — STANDARDIZED SENNA CONCENTRATE AND DOCUSATE SODIUM 1 TABLET: 8.6; 5 TABLET, FILM COATED ORAL at 08:13

## 2017-03-08 RX ADMIN — IBUPROFEN 800 MG: 400 TABLET, FILM COATED ORAL at 01:20

## 2017-03-08 NOTE — ROUTINE PROCESS
Bedside and Verbal shift change report given to Alex Ortiz RN (oncoming nurse) by Ildefonso David RN (offgoing nurse). Report included the following information SBAR, Kardex, Intake/Output, MAR and Recent Results. Patient ambulating without any complaints and patient voiding without problems. Pain controlled with Motrin and Percocet. Bonding well with infant. Vital signs stable.

## 2017-03-08 NOTE — ROUTINE PROCESS
0705--Bedside and Verbal shift change report given to Abbie Grimm RN (oncoming nurse) by Asif Roper RN (offgoing nurse). Report included the following information SBAR, Kardex, Intake/Output, MAR and Recent Results.

## 2017-03-08 NOTE — LACTATION NOTE
Mother states baby is continuing to nurse well but she is unsure if the baby is getting enough to eat. Reviewed colostrum, feeding frequency, hunger vs urge to suck, diapers, milk coming in, pumping and nipple care. Lots of discussion/encouragement. Offered assistance if needed before discharge.

## 2017-03-08 NOTE — ROUTINE PROCESS
Educated patient on pain medication. Patient states her pain is currently a 0 but can go up to a 5 when she has cramping. Educated patient on motrin. Patient states she wants percocet. Educated patient on percocet and told patient that it is not recommended to take for cramping and that she will go home on motrin only. Patient states she wants percocet. Educated patient on side effects. Patient states that even though she is not in pain currently she wants percocet and she wants two tablets.

## 2017-03-08 NOTE — ROUTINE PROCESS
0930--Educated patient on  testing. Educated patient on  care. Educated patient on infant feeding cues.

## 2017-03-08 NOTE — ROUTINE PROCESS
0918--Dr. Artis rounded on infants mother. Mother states that Dr. Gsiela Rivas said she would be here around noon to circumcise baby. 1330--Called [facility]. Asked when Dr. Gisela Rivas was coming to do the circumcision. Was told that Dr. Gisela Rivas was in surgery and would come up between cases. 1550--Called Dr. Ciara Hampton. Asked if Dr. Ciara Hampton could perform the circumcision because patient is ready to go home. 1600--Dr. Monzon spoke with Dr. Gisela Rivas and states that Dr. Gisela Rivas will be doing the circumcision when she is done with surgery. 1605--Notified patient of plan.  Patient states that it is an inconvenience but she would like to get it done before they leave the hospital.

## 2017-03-09 RX ORDER — GABAPENTIN 300 MG/1
CAPSULE ORAL
Qty: 90 CAP | Refills: 0 | Status: SHIPPED | OUTPATIENT
Start: 2017-03-09

## 2017-03-09 NOTE — PROGRESS NOTES
Patient condition is stable. discharge instructions given. ID bands matched with infant. Bother mother and baby escorted to the lobby by  to be discharged home.

## 2017-03-09 NOTE — DISCHARGE SUMMARY
Obstetrical Discharge Summary       [address] [facility]  [facility] [address] [phone]              Patient ID:  Shade Guzman  [id number]  39 y.o.  1980    Admit Date: 3/6/2017    Discharge Date: 3/8/2017     Admitting Physician: Paula Coronado DO     Admission Diagnoses: maternity;Post term pregnancy    Discharge Diagnoses: same as above      Additional Diagnoses: none        Hospital Course: Unremarkable                           Information for the patient's :  Orsanta Torresof [[id number]]   One Minute Apgar: 8 (Filed from Delivery Summary)  Five  Minute Apgar: 9 (Filed from Delivery Summary)      Immunizations:    Immunization History   Administered Date(s) Administered    Influenza Vaccine (Quad) PF 2017    Tdap 2017       Group Beta Strep:   GrBStrep, External   Date Value Ref Range Status   2017 neg  Final        Visit Vitals    /72 (BP 1 Location: Right arm, BP Patient Position: At rest;Head of bed elevated (Comment degrees))    Pulse 82    Temp 98.1 °F (36.7 °C)    Resp 16    LMP 2016    SpO2 100%    Breastfeeding Yes       Vital signs stable, afebrile. Exam:  Patient without distress. Abdomen soft, fundus firm at level of umbilicus, non tender               Perineum with normal lochia noted. Lower extremities are negative for swelling, cords or tenderness. Patient Instructions:   Current Discharge Medication List      START taking these medications    Details   ibuprofen (MOTRIN) 800 mg tablet Take 1 Tab by mouth every eight (8) hours as needed. Qty: 30 Tab, Refills: 0         CONTINUE these medications which have NOT CHANGED    Details   nicotine (NICORETTE) 2 mg gum Take 2 Each by mouth every two (2) hours as needed for Smoking Cessation (PtDeni streeter) for up to 30 days.  Indications: SMOKING CESSATION  Qty: 100 Each, Refills: 3    Associated Diagnoses: Smoking addiction      pnv w/o calcium-iron fum-fa 27-1 mg tab Take 1 Tab by mouth daily. Qty: 90 Tab, Refills: prn      lamoTRIgine (LAMICTAL) 100 mg tablet Take 3 tabs by mouth daily. Qty: 90 Tab, Refills: 6      gabapentin (NEURONTIN) 300 mg capsule Take 1 Cap by mouth three (3) times daily. Qty: 90 Cap, Refills: 0    Associated Diagnoses: Bipolar 1 disorder ([facility] [address]             See discharge instructions provided by nursing. Follow-up in 6 weeks.     Signed:  Sy Bowens DO  3/8/2017  7:15 PM

## 2017-05-16 ENCOUNTER — ROUTINE PRENATAL (OUTPATIENT)
Dept: OBGYN CLINIC | Age: 37
End: 2017-05-16

## 2017-05-16 VITALS
HEIGHT: 69 IN | HEART RATE: 91 BPM | BODY MASS INDEX: 29.77 KG/M2 | DIASTOLIC BLOOD PRESSURE: 82 MMHG | SYSTOLIC BLOOD PRESSURE: 123 MMHG | WEIGHT: 201 LBS

## 2017-05-16 DIAGNOSIS — O92.79 PROLONGED LACTATION: ICD-10-CM

## 2017-05-16 DIAGNOSIS — Z72.0 TOBACCO USE: ICD-10-CM

## 2017-05-16 DIAGNOSIS — Z30.09 FAMILY PLANNING: Primary | ICD-10-CM

## 2017-05-16 LAB
HCG URINE, QL. (POC): NEGATIVE
VALID INTERNAL CONTROL?: YES

## 2017-05-16 RX ORDER — MEDROXYPROGESTERONE ACETATE 150 MG/ML
150 INJECTION, SUSPENSION INTRAMUSCULAR
Qty: 1 ML | Refills: 4 | Status: SHIPPED | OUTPATIENT
Start: 2017-05-16 | End: 2018-10-01 | Stop reason: SDUPTHER

## 2017-05-16 NOTE — PATIENT INSTRUCTIONS
Learning About Birth Control: The Shot  What is the shot? The shot is used to prevent pregnancy. You get the shot in your upper arm or rear end (buttocks). The shot gives you a dose of the hormone progestin. The shot is often called by its brand name, Depo-Provera. Progestin prevents pregnancy in these ways: It thickens the mucus in the cervix. This makes it hard for sperm to travel into the uterus. It also thins the lining of the uterus, which makes it harder for a fertilized egg to attach to the uterus. Progestin can sometimes stop the ovaries from releasing an egg each month (ovulation). The shot provides birth control for 3 months at a time. You then need another shot. The shot can cause bone loss. Most women can use it safely for up to 2 years and then may choose to switch to another form of birth control. Some women may be able to use the shot for more than 2 years. How well does it work? In the first year of use:  · When the shot is used exactly as directed, fewer than 1 woman out of 100 has an unplanned pregnancy. · When the shot is not used exactly as directed, 6 women out of 100 have an unplanned pregnancy. Be sure to tell your doctor about any health problems you have or medicines you take. He or she can help you choose the birth control method that is right for you. What are the advantages of the shot? · The shot is one of the most effective methods of birth control. · It's convenient. You need to get a shot only once every 3 months to prevent pregnancy. You don't have to interrupt sex to protect against pregnancy. · It prevents pregnancy for 3 months at a time. You don't have to worry about birth control for this time. · It's safe to use while breastfeeding. · The shot may reduce heavy bleeding and cramping. · The shot doesn't contain estrogen. So you can use it if you don't want to take estrogen or can't take estrogen because you have certain health problems or concerns.   What are the disadvantages of the shot? · The shot doesn't protect against sexually transmitted infections (STIs), such as herpes or HIV/AIDS. If you aren't sure if your sex partner might have an STI, use a condom to protect against disease. · The shot may cause bone loss in some women. You shouldn't use this method for more than 2 years without talking to your doctor first about the risks and benefits. · Any side effects may last up to 3 months. ¨ The shot may cause irregular periods, or you may have spotting between periods. You may also stop getting a period. Some women see having no period as an advantage. ¨ It may cause mood changes, less interest in sex, or weight gain. · You must go to the doctor every 3 months to get the shot. · If you want to get pregnant, it may take 9 to 10 months after you stop getting the shot. This is because the hormones the shot provided have to leave your system, and your body has to readjust.  · If you have severe side effects, you have to wait for the hormones to get out of your system. This may take up to 3 months. Where can you learn more? Go to http://leticia-steffi.info/. Enter Q947 in the search box to learn more about \"Learning About Birth Control: The Shot. \"  Current as of: May 30, 2016  Content Version: 11.2  © 2224-2987 Healthwise, Incorporated. Care instructions adapted under license by [employer] (which disclaims liability or warranty for this information). If you have questions about a medical condition or this instruction, always ask your healthcare professional. [place] [address] any warranty or liability for your use of this information.

## 2017-05-16 NOTE — PROGRESS NOTES
Subjective:      Bc Collins is here for her depoprovera injection. Patient wishes to continue depoprovera treatment for contraception. Side effects of treatment to date: none. Standing order is on patient's medication list.    Last Depoprovera injection date: NONE   Last Pap smear date: 09/07/17    Objective:     Visit Vitals    /82    Pulse 91    Ht 5' 9\" (1.753 m)    Wt 201 lb (91.2 kg)    BMI 29.68 kg/m2       Assessment/Plan:     Stable, doing well on Depoprovera, appropriate to continue. Depoprovera 150 mg IM given. INJECTION PACED IN LEFT DELTOID , LOT [id number], EXPIRES 12/01/2019    She tolerated the injection well, see Immunization activity for details. Oneyda Zamorano LPN     current treatment plan is effective, no change in therapy.

## 2017-05-17 NOTE — PROGRESS NOTES
Subjective:      Sangita Reza is a 39 y.o. female who is here for family planning. Patient still BF and also still smoking. Amenorrheic. Sexual history: single partner, contraception - condoms. Prior Pap smear:was normal. 2016    C/o social anxiety, very difficult to leave the house for appointments or even just going to the store. Patient has an appt with psych today. Patient very pleasant and personable one-on-one. 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 [address] F31.9    Advanced maternal age in multigravida O12.46    Post term pregnancy O48.0     Current Outpatient Prescriptions   Medication Sig Dispense Refill    medroxyPROGESTERone (DEPO-PROVERA) 150 mg/mL injection 1 mL by IntraMUSCular route every three (3) months. Indications: Pregnancy Contraception 1 mL 4    AMBULATORY BREAST PUMP Single electric breast pump of patient's choice. 1 Units 0    gabapentin (NEURONTIN) 300 mg capsule TAKE 1 CAPSULE BY MOUTH THREE TIMES DAILY 90 Cap 0    pnv w/o calcium-iron fum-fa 27-1 mg tab Take 1 Tab by mouth daily. 90 Tab prn    lamoTRIgine (LAMICTAL) 100 mg tablet Take 3 tabs by mouth daily. 90 Tab 6     No Known Allergies  Past Medical History:   Diagnosis Date    Chlamydia     Depression     Female hirsutism 2009    Gonorrhea 2010    Social Anxiety disorder w Agoraphobia 2009    Thyroid activity decreased     Trauma     Well female exam     GYN : Dr René Kelly     History reviewed. No pertinent surgical history.   Family History   Problem Relation Age of Onset    Hypertension Mother     Bipolar Disorder Mother     No Known Problems Father      Social History   Substance Use Topics    Smoking status: Current Every Day Smoker     Packs/day: 0.25     Years: 25.00    Smokeless tobacco: Never Used      Comment: using nicorette gum    Alcohol use No        Review of Systems:   General ROS: negative for fever, chills, malaise  Endocrine ROS: negative for -  breast tenderness/mass/nipple discharge/galactorrhea, hair pattern changes, skin changes or temperature intolerance. Respiratory ROS: no cough, shortness of breath, or wheezing  Cardiovascular ROS: no chest pain or dyspnea on exertion  Gastrointestinal ROS: no abdominal pain, change in bowel habits, or black or bloody stools, nausea, vomiting  Genito-Urinary ROS: no dysuria, trouble voiding, incontinence or hematuria. No pelvic pain, abnormal bleeding, unusual discharge, vaginal dryness  Musculoskeletal ROS: negative  Neurological ROS: no TIA or stroke symptoms  Dermatological ROS: negative       Objective:     Visit Vitals    /82    Pulse 91    Ht 5' 9\" (1.753 m)    Wt 201 lb (91.2 kg)    BMI 29.68 kg/m2      Physical Exam:   General appearance - alert, well appearing, and in no distress, oriented to person, place, and time  Mental status - alert, oriented to person, place, and time, normal mood, behavior, speech, dress, motor activity, and thought processes  Neck:  No lymphadenopathy, no thyroid enlargement/tenderness  Respiratory:  Normal respiratory effort, no intercostal retractions or use of accessory muscles. Abdomen - soft, nontender, nondistended, no masses or organomegaly  Pelvic - No inguinal lymphadenopathy, normal urethral meatus and no bladder tenderness. VULVA: normal appearing vulva with no masses, tenderness or lesions,   VAGINA: normal appearing vagina with normal color and discharge, no lesions,   PELVIC FLOOR EXAM: no cystocele, rectocele or prolapse noted  CERVIX: normal appearing cervix without discharge or lesions,   UTERUS: uterus is normal size, shape, consistency and nontender, mobile,   ADNEXA: normal adnexa in size, nontender and no masses. Extremities - No edema.   Skin - normal coloration and turgor, no rashes, no suspicious skin lesions noted    Assessment/Plan:       ICD-10-CM ICD-9-CM    1. Family planning Z30.09 V25.09 AMB POC URINE PREGNANCY TEST, VISUAL COLOR COMPARISON      medroxyPROGESTERone (DEPO-PROVERA) 150 mg/mL injection      AL MEDROXYPROGESTERONE ACETATE, 1MG      AL THER/PROPH/DIAG INJECTION, SUBCUT/IM   2. Prolonged lactation O92.79 676.90 AMBULATORY BREAST PUMP   3. Tobacco use Z72.0 305.1      Long discussion regarding smoking and BF. Patient to ask psychiatrist if Wellbutrin an option. Patient smokes 5-6 cigs/day. Discussed options for contraception with patient. Patient desires to be on Depo Provera. Discussed common SE including irregular bleeding, weight gain, reversible reduction in bone mineral density, amenorrhea, mood changes, headache. Denies any history or current use of aminoglutethimide, or a desired pregnancy within the next year because a delay in return in fertility occurs with DMPA, or history of long term use of corticosteroid therapy, or known breast cancer. Patient desires TUBAL LIGATION. [address] [facility] info given. lab results and schedule of future lab studies reviewed with patient     Discussed with patient that our office will call for abnormal lab results. Normal results can be reviewed through [employer]. If patient has not received a phone call from our office or there are no results found on [employer], the patient has been instructed to call our office to follow up on results. I have verbalized the plan of care with patient. The patient was given a full opportunity to ask questions, indicated that her questions had been answered and expressed understanding.

## 2017-05-23 ENCOUNTER — DOCUMENTATION ONLY (OUTPATIENT)
Dept: FAMILY MEDICINE CLINIC | Age: 37
End: 2017-05-23

## 2017-05-23 NOTE — LETTER
5/23/2017 Joan Valentin [address] [place] [id number] Dear Ms. Joan Valentin, We had an appointment reserved for you 5/22/2017 and were concerned when you did not show or call within 24 hours to cancel the appointment. Our policy is to call patients two days prior to their appointment to remind them of the date and time. We perform these calls as a courtesy to our patients and to allow us the opportunity to rebook the time slot should the appointment not be necessary. Recognizing that everyones time is valuable and that appointment time is limited, we ask that you provide 24 hours notice if you are unable to keep your appointment. Please call us at your earliest convenience to reschedule your appointment as your provider felt it was important to see you. Thank you for your anticipated cooperation. The scheduling staff: 
 
[address] [place] [address],[address] [place] [id number] [phone]

## 2017-07-28 DIAGNOSIS — Z71.6 ENCOUNTER FOR SMOKING CESSATION COUNSELING: ICD-10-CM

## 2017-07-28 RX ORDER — NICOTINE POLACRILEX 2 MG/1
GUM, CHEWING ORAL
Qty: 400 EACH | Refills: 3 | Status: SHIPPED | OUTPATIENT
Start: 2017-07-28 | End: 2018-10-29 | Stop reason: DRUGHIGH

## 2017-09-25 ENCOUNTER — HOSPITAL ENCOUNTER (EMERGENCY)
Age: 37
Discharge: HOME | End: 2017-09-25
Attending: EMERGENCY MEDICINE
Payer: COMMERCIAL

## 2017-09-25 VITALS
RESPIRATION RATE: 18 BRPM | HEART RATE: 75 BPM | SYSTOLIC BLOOD PRESSURE: 122 MMHG | TEMPERATURE: 98 F | OXYGEN SATURATION: 100 % | DIASTOLIC BLOOD PRESSURE: 88 MMHG

## 2017-09-25 DIAGNOSIS — L03.213 PERIORBITAL CELLULITIS OF RIGHT EYE: Primary | ICD-10-CM

## 2017-09-25 PROCEDURE — 74011000250 HC RX REV CODE- 250: Performed by: EMERGENCY MEDICINE

## 2017-09-25 PROCEDURE — 99283 EMERGENCY DEPT VISIT LOW MDM: CPT

## 2017-09-25 RX ORDER — CEPHALEXIN 500 MG/1
500 CAPSULE ORAL 4 TIMES DAILY
Qty: 28 CAP | Refills: 0 | Status: SHIPPED | OUTPATIENT
Start: 2017-09-25 | End: 2017-10-02

## 2017-09-25 RX ORDER — TETRACAINE HYDROCHLORIDE 5 MG/ML
1 SOLUTION OPHTHALMIC
Status: DISPENSED | OUTPATIENT
Start: 2017-09-25 | End: 2017-09-25

## 2017-09-25 RX ORDER — CETIRIZINE HCL 10 MG
10 TABLET ORAL DAILY
Qty: 30 TAB | Refills: 0 | Status: SHIPPED | OUTPATIENT
Start: 2017-09-25 | End: 2017-10-25

## 2017-09-25 RX ADMIN — FLUORESCEIN SODIUM 1 STRIP: 1 STRIP OPHTHALMIC at 17:21

## 2017-09-25 RX ADMIN — TETRACAINE HYDROCHLORIDE 1 DROP: 5 SOLUTION OPHTHALMIC at 17:21

## 2017-09-25 RX ADMIN — TETRACAINE HYDROCHLORIDE 1 DROP: 5 SOLUTION OPHTHALMIC at 17:03

## 2017-09-25 NOTE — ED PROVIDER NOTES
HPI Comments: 3:49 PM Maykel Blanca is a 44 y.o. Female w/ PMHx of psychiatric disorders who presents to the ED c/o  Right eye itching, pain, and redness onset 7 days. Pt had similar symptoms in this eye 1 month ago but it cleared up untreated. Pt's current symptoms returned and have gotten worse over the last 7 days. Pt used a cream for pediatric eye infections, however she had a skin reaction and has since stopped using that cream. Pt stated it was \"crusty\" this morning upon waking up and it has felt 'like there is sand stuck in it\". Pt denies vision changes as well as cough and fever. Pt has no other sx or complaints. Past Medical History:   Diagnosis Date    Chlamydia 2016    Depression     Female hirsutism 1/27/2009    Gonorrhea 2010    Social Anxiety disorder w Agoraphobia 1/27/2009    Thyroid activity decreased     Trauma 2010    Well female exam 5/09    GYN : Dr Kristopher Zimmerman       History reviewed. No pertinent surgical history. Family History:   Problem Relation Age of Onset    Hypertension Mother     Bipolar Disorder Mother     No Known Problems Father        Social History     Social History    Marital status:      Spouse name: N/A    Number of children: N/A    Years of education: N/A     Occupational History    Not on file. Social History Main Topics    Smoking status: Current Every Day Smoker     Packs/day: 0.25     Years: 25.00    Smokeless tobacco: Never Used      Comment: using nicorette gum    Alcohol use No    Drug use: No    Sexual activity: Yes     Partners: Male     Other Topics Concern    Not on file     Social History Narrative         ALLERGIES: Review of patient's allergies indicates no known allergies. Review of Systems   Constitutional: Negative. Eyes: Positive for pain, redness and itching. Negative for discharge. All other systems reviewed and are negative.       Vitals:    09/25/17 1543   BP: 122/88   Pulse: 75   Resp: 18   Temp: 98 °F (36.7 °C)   SpO2: 100%            Physical Exam   Constitutional: She appears well-developed and well-nourished. No distress. HENT:   Redness and swelling right lower eyelid area yamilet-orbital slight TTP   Eyes: Right eye exhibits no chemosis, no discharge and no exudate. Right conjunctiva is not injected. Right conjunctiva has no hemorrhage. Right pupil is reactive. Slit lamp exam:       The right eye shows no corneal abrasion, no corneal ulcer and no fluorescein uptake. Pulmonary/Chest: Effort normal. No respiratory distress. Neurological: She is alert. Skin: Skin is warm. She is not diaphoretic. Psychiatric: She has a normal mood and affect. Nursing note and vitals reviewed. MDM  Number of Diagnoses or Management Options  Periorbital cellulitis of right eye:   Diagnosis management comments: Maybe allergic - will cover for cellultiis as well - no ocular invovlement    ED Course       Procedures  Scribe Attestation      Anita Foreman acting as a scribe for and in the presence of Nataly Markham MD      September 25, 2017 at 3:49 PM       Provider Attestation:      I personally performed the services described in the documentation, reviewed the documentation, as recorded by the scribe in my presence, and it accurately and completely records my words and actions.  September 25, 2017 at 3:49 PM - Nataly Markham MD

## 2017-09-25 NOTE — DISCHARGE INSTRUCTIONS

## 2018-05-04 ENCOUNTER — DOCUMENTATION ONLY (OUTPATIENT)
Dept: FAMILY MEDICINE CLINIC | Age: 38
End: 2018-05-04

## 2018-05-04 NOTE — LETTER
5/4/2018 Amanda Spence [address] [facility] [address] [phone] Dear Ms. Amanda Spence, We had an appointment reserved for you 4/25/18 and were concerned when you did not show or call within 24 hours to cancel the appointment. As mentioned in a previous letter to you, appointment time is limited and no-showed appointments may prevent another sick individual who needs to be seen from getting a preferred appointment time. Please note that a continued pattern of not showing for appointments may result in your inability to pre-book future appointments as well as possible discharge from the practice. Please call us at your earliest convenience to reschedule your appointment as your provider felt it was important to see you. Thank you for your anticipated cooperation. Sincerely, The scheduling staff [address] [id number] [phone]

## 2018-10-01 ENCOUNTER — HOSPITAL ENCOUNTER (OUTPATIENT)
Dept: LAB | Age: 38
Discharge: HOME | End: 2018-10-01

## 2018-10-01 ENCOUNTER — OFFICE VISIT (OUTPATIENT)
Dept: OBGYN CLINIC | Age: 38
End: 2018-10-01

## 2018-10-01 VITALS
DIASTOLIC BLOOD PRESSURE: 78 MMHG | OXYGEN SATURATION: 100 % | RESPIRATION RATE: 18 BRPM | HEIGHT: 69 IN | SYSTOLIC BLOOD PRESSURE: 115 MMHG | HEART RATE: 76 BPM | BODY MASS INDEX: 26.57 KG/M2 | WEIGHT: 179.4 LBS

## 2018-10-01 DIAGNOSIS — Z30.09 FAMILY PLANNING: ICD-10-CM

## 2018-10-01 DIAGNOSIS — Z01.419 WELL WOMAN EXAM: Primary | ICD-10-CM

## 2018-10-01 LAB
HCG URINE, QL. (POC): NEGATIVE
VALID INTERNAL CONTROL?: YES

## 2018-10-01 PROCEDURE — 99001 SPECIMEN HANDLING PT-LAB: CPT | Performed by: SPECIALIST

## 2018-10-01 RX ORDER — MEDROXYPROGESTERONE ACETATE 150 MG/ML
150 INJECTION, SUSPENSION INTRAMUSCULAR
Qty: 1 ML | Refills: 4 | Status: SHIPPED | OUTPATIENT
Start: 2018-10-01

## 2018-10-01 RX ORDER — MEDROXYPROGESTERONE ACETATE 150 MG/ML
150 INJECTION, SUSPENSION INTRAMUSCULAR ONCE
Qty: 1 ML | Refills: 0
Start: 2018-10-01 | End: 2018-10-01

## 2018-10-01 NOTE — MR AVS SNAPSHOT
[facility] 
 
 
 [address] [facility] [address] [phone] 
[phone] Patient: Mannie Augustin MRN: [id number] Visit Information Date & Time Provider Department Dept. Phone Encounter #  
 10/1/2018 11:00 AM Garth Cooper Arm [id number] Follow-up Instructions Return in about 1 year (around 10/1/2019) for Annual Exam or prn. Follow-up and Disposition History Your Appointments 12/18/2018  3:15 PM  
DEPO with Kenneth Casarez MD  
[facility] ([address]) Appt Note: Depo Ellen. [address] [place] [address] [employer] 83 [phone]  
  
   
 [address] [place] [address] [address] [employer] [address] [phone] Upcoming Health Maintenance Date Due Influenza Age 5 to Adult 8/1/2018 PAP AKA CERVICAL CYTOLOGY 9/6/2019 Allergies as of 10/1/2018  Review Complete On: 10/1/2018 By: Crescencio Medina LPN No Known Allergies Current Immunizations  Reviewed on 3/6/2017 Name Date Influenza Vaccine (Quad) PF 1/13/2017 Tdap 1/13/2017 Not reviewed this visit You Were Diagnosed With   
  
 Codes Comments Well woman exam    -  Primary ICD-10-CM: K70.708 ICD-9-CM: V72.31 Family planning     ICD-10-CM: Z30.09 
ICD-9-CM: V25.09 Vitals BP Pulse Resp Height(growth percentile) Weight(growth percentile) SpO2  
 115/78 76 18 5' 9\" (1.753 m) 179 lb 6.4 oz (81.4 kg) 100% BMI OB Status Smoking Status 26.49 kg/m2 Recent pregnancy Current Every Day Smoker BMI and BSA Data Body Mass Index Body Surface Area  
 26.49 kg/m 2 1.99 m 2 Preferred Pharmacy Pharmacy Name Phone [facility] - [address], [address] [phone] Your Updated Medication List  
  
   
This list is accurate as of 10/1/18  3:40 PM.  Always use your most recent med list. AMBULATORY BREAST PUMP Single electric breast pump of patient's choice. gabapentin 300 mg capsule Commonly known as:  NEURONTIN  
TAKE 1 CAPSULE BY MOUTH THREE TIMES DAILY  
  
 lamoTRIgine 100 mg tablet Commonly known as: LaMICtal  
Take 3 tabs by mouth daily. medroxyPROGESTERone 150 mg/mL injection Commonly known as:  DEPO-PROVERA  
1 mL by IntraMUSCular route every three (3) months. Indications: Pregnancy Contraception NICORETTE 2 mg gum Generic drug:  nicotine TAKE ONE BY MOUTH AS NEEDED FOR SMOKING CESSATION. pnv w/o calcium-iron fum-fa 27-1 mg Tab Take 1 Tab by mouth daily. Prescriptions Sent to Pharmacy Refills  
 medroxyPROGESTERone (DEPO-PROVERA) 150 mg/mL injection 4 Si mL by IntraMUSCular route every three (3) months. Indications: Pregnancy Contraception Class: Normal  
 Pharmacy: [address] [place] [address], [address] Ph #: [phone] Route: IntraMUSCular We Performed the Following AMB POC URINE PREGNANCY TEST, VISUAL COLOR COMPARISON [57809 CPT(R)] HEP B SURFACE AG [id number] CPT(R)] HEPATITIS C AB [04510 CPT(R)] HIV 1/2 AG/AB, 4TH GENERATION,W RFLX CONFIRM [id number] CPT(R)] T PALLIDUM AB [[id number] Custom] Follow-up Instructions Return in about 1 year (around 10/1/2019) for Annual Exam or prn. To-Do List   
 10/01/2018 Lab:  HEP B SURFACE AG   
  
 10/01/2018 Lab:  HEPATITIS C AB   
  
 10/01/2018 Lab:  HIV 1/2 AG/AB, 4TH GENERATION,W RFLX CONFIRM   
  
 10/01/2018 Pathology:  PAP IG, CT-NG TV HPV 16&18,45 ([id number], [id number]) 10/01/2018 Lab:  T PALLIDUM AB Introducing [facility]! [employer] introduces [employer] patient portal. Now you can access parts of your medical record, email your doctor's office, and request medication refills online. 1. In your internet browser, go to [email or web address] 2. Click on the First Time User? Click Here link in the Sign In box. You will see the New Member Sign Up page. 3. Enter your [employer] Access Code exactly as it appears below. You will not need to use this code after youve completed the sign-up process. If you do not sign up before the expiration date, you must request a new code. · [employer] Access Code: [email or web address] Expires: 12/30/2018 11:01 AM 
 
4. Enter the last four digits of your Social Security Number (xxxx) and Date of Birth (mm/dd/yyyy) as indicated and click Submit. You will be taken to the next sign-up page. 5. Create a LOFTY ID. This will be your [employer]Y login ID and cannot be changed, so think of one that is secure and easy to remember. 6. Create a [employer]Y password. You can change your password at any time. 7. Enter your Password Reset Question and Answer. This can be used at a later time if you forget your password. 8. Enter your e-mail address. You will receive e-mail notification when new information is available in [address] 9. Click Sign Up. You can now view and download portions of your medical record. 10. Click the Download Summary menu link to download a portable copy of your medical information. If you have questions, please visit the Frequently Asked Questions section of the [employer] website. Remember, [employer] is NOT to be used for urgent needs. For medical emergencies, dial 911. Now available from your iPhone and Android! Please provide this summary of care documentation to your next provider. Your primary care clinician is listed as [address]. If you have any questions after today's visit, please call [phone].

## 2018-10-01 NOTE — PROGRESS NOTES
Subjective:   43 y.o. female for Well Woman Check. No LMP recorded. Social History: single partner, contraception - none. Pertinent past medical hstory: current smoker. 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 Indications: Pregnancy Contraception 1 mL 4    NICORETTE 2 mg gum TAKE ONE BY MOUTH AS NEEDED FOR SMOKING CESSATION. 400 Each 3    AMBULATORY BREAST PUMP Single electric breast pump of patient's choice. 1 Units 0    gabapentin (NEURONTIN) 300 mg capsule TAKE 1 CAPSULE BY MOUTH THREE TIMES DAILY 90 Cap 0    pnv w/o calcium-iron fum-fa 27-1 mg tab Take 1 Tab by mouth daily. 90 Tab prn    lamoTRIgine (LAMICTAL) 100 mg tablet Take 3 tabs by mouth daily. 90 Tab 6     No Known Allergies  Past Medical History:   Diagnosis Date    Chlamydia 2016    Depression     Female hirsutism 1/27/2009    Gonorrhea 2010    Social Anxiety disorder w Agoraphobia 1/27/2009    Thyroid activity decreased     Trauma 2010    Well female exam 5/09    GYN : Dr Lourdes Douglas     No past surgical history on file. Family History   Problem Relation Age of Onset    Hypertension Mother     Bipolar Disorder Mother     No Known Problems Father      Social History   Substance Use Topics    Smoking status: Current Every Day Smoker     Packs/day: 0.25     Years: 25.00    Smokeless tobacco: Never Used      Comment: using nicorette gum    Alcohol use No        ROS:  Feeling well. No dyspnea or chest pain on exertion. No abdominal pain, change in bowel habits, black or bloody stools. No urinary tract symptoms.  GYN ROS: normal menses, no abnormal bleeding, pelvic pain or discharge, no breast pain or new or enlarging lumps on self exam. No neurological complaints. Objective:     Visit Vitals    /78    Pulse 76    Resp 18    Ht 5' 9\" (1.753 m)    Wt 179 lb 6.4 oz (81.4 kg)    SpO2 100%    BMI 26.49 kg/m2     The patient appears well, alert, oriented x 3, in no distress. ENT normal.  Neck supple. No adenopathy or thyromegaly. TITI. Lungs are clear, good air entry, no wheezes, rhonchi or rales. S1 and S2 normal, no murmurs, regular rate and rhythm. Abdomen soft without tenderness, guarding, mass or organomegaly. Extremities show no edema, normal peripheral pulses. Neurological is normal, no focal findings. BREAST EXAM: breasts appear normal, no suspicious masses, no skin or nipple changes or axillary nodes    PELVIC EXAM: normal external genitalia, vulva, vagina, cervix, uterus and adnexa    Assessment/Plan:   well woman  no contraindication to begin hormonal therapy  pap smear  return annually or prn    ICD-10-CM ICD-9-CM    1. Well woman exam Z01.419 V72.31 T PALLIDUM AB      HEP B SURFACE AG      HEPATITIS C AB      HIV 1/2 AG/AB, 4TH GENERATION,W RFLX CONFIRM      PAP IG, CT-NG TV HPV 16&18,45 ([id number], [id number])      T PALLIDUM AB      HEP B SURFACE AG      HEPATITIS C AB      HIV 1/2 AG/AB, 4TH GENERATION,W RFLX CONFIRM      AMB POC URINE PREGNANCY TEST, VISUAL COLOR COMPARISON   2. Family planning Z30.09 V25.09 AMB POC URINE PREGNANCY TEST, VISUAL COLOR COMPARISON      medroxyPROGESTERone (DEPO-PROVERA) 150 mg/mL injection     Encounter Diagnoses   Name Primary?     Well woman exam Yes    Family planning      Orders Placed This Encounter    T PALLIDUM AB    HEP B SURFACE AG    HEPATITIS C AB    HIV 1/2 AG/AB, 4TH GENERATION,W RFLX CONFIRM    AMB POC URINE PREGNANCY TEST, VISUAL COLOR COMPARISON    medroxyPROGESTERone (DEPO-PROVERA) 150 mg/mL injection    PAP IG, CT-NG TV HPV 16&18,45 ([id number], [id number])     Follow-up Disposition:  Return in about 1 year (around 10/1/2019) for Annual Exam or prn. Starr Wan

## 2018-10-01 NOTE — PROGRESS NOTES
Visit Vitals    /78    Pulse 76    Resp 18    Ht 5' 9\" (1.753 m)    Wt 179 lb 6.4 oz (81.4 kg)    SpO2 100%    BMI 26.49 kg/m2     Patient here for Well Woman Exam desires cultures and bloodwork.

## 2018-10-03 LAB
HBV SURFACE AG SERPL QL IA: NEGATIVE
HCV AB S/CO SERPL IA: <0.1 S/CO RATIO (ref 0–0.9)
HIV 1+2 AB+HIV1 P24 AG SERPL QL IA: NON REACTIVE
T PALLIDUM AB SER QL IA: NEGATIVE

## 2018-10-09 ENCOUNTER — TELEPHONE (OUTPATIENT)
Dept: OBGYN CLINIC | Age: 38
End: 2018-10-09

## 2018-10-09 LAB
C TRACH RRNA CVX QL NAA+PROBE: NEGATIVE
CYTOLOGIST CVX/VAG CYTO: ABNORMAL
CYTOLOGY CVX/VAG DOC THIN PREP: ABNORMAL
DX ICD CODE: ABNORMAL
DX ICD CODE: ABNORMAL
HPV I/H RISK 1 DNA CVX QL PROBE+SIG AMP: POSITIVE
Lab: ABNORMAL
N GONORRHOEA RRNA CVX QL NAA+PROBE: NEGATIVE
OTHER STN SPEC: ABNORMAL
PATH REPORT.FINAL DX SPEC: ABNORMAL
PATH REPORT.RELEVANT HX SPEC: ABNORMAL
PATHOLOGIST CVX/VAG CYTO: ABNORMAL
STAT OF ADQ CVX/VAG CYTO-IMP: ABNORMAL
T VAGINALIS RRNA SPEC QL NAA+PROBE: NEGATIVE

## 2018-10-09 NOTE — TELEPHONE ENCOUNTER
S/w patient notified her of abnormal labs all questions answered patient verbalized understanding.  colpo appt made

## 2018-10-09 NOTE — TELEPHONE ENCOUNTER
----- Message from Nimesh Barton MD sent at 10/9/2018  9:31 AM EDT -----  ASCUS, +HPV. Needs colpo. Please advise. Thanks.

## 2018-10-29 ENCOUNTER — OFFICE VISIT (OUTPATIENT)
Dept: OBGYN CLINIC | Age: 38
End: 2018-10-29

## 2018-10-29 VITALS
WEIGHT: 184.6 LBS | OXYGEN SATURATION: 100 % | HEIGHT: 69 IN | RESPIRATION RATE: 20 BRPM | DIASTOLIC BLOOD PRESSURE: 60 MMHG | HEART RATE: 77 BPM | BODY MASS INDEX: 27.34 KG/M2 | SYSTOLIC BLOOD PRESSURE: 109 MMHG

## 2018-10-29 DIAGNOSIS — R87.610 ASCUS WITH POSITIVE HIGH RISK HPV CERVICAL: Primary | ICD-10-CM

## 2018-10-29 DIAGNOSIS — Z72.0 DECLINED SMOKING CESSATION: ICD-10-CM

## 2018-10-29 DIAGNOSIS — R87.810 ASCUS WITH POSITIVE HIGH RISK HPV CERVICAL: Primary | ICD-10-CM

## 2018-10-29 RX ORDER — DIPHENHYDRAMINE HCL 25 MG
4 CAPSULE ORAL AS NEEDED
Qty: 20 EACH | Refills: 5 | Status: SHIPPED | OUTPATIENT
Start: 2018-10-29 | End: 2018-11-28

## 2018-10-29 NOTE — PROCEDURES
Colposcopy Procedure Note    Indications:   Most recent Pap smear 4 weeks ago result: ASCUS with POSITIVE high risk HPV. Procedure Details   The risks and benefits of the procedure and written informed consent obtained. Speculum placed in vagina and excellent visualization of cervix achieved, cervix swabbed x 3 with acetic acid solution. Findings:  Cervix: no visible lesions; no biopsies taken and endocervical curettage performed. Specimens: endocervical curettage    Complications: none. Plan:  Specimens labelled and sent to Pathology. Will base further treatment on Pathology findings. Will call with results.

## 2018-10-31 LAB
DX ICD CODE: NORMAL
PATH REPORT.FINAL DX SPEC: NORMAL
PATH REPORT.GROSS SPEC: NORMAL
PATH REPORT.SITE OF ORIGIN SPEC: NORMAL
PATHOLOGIST NAME: NORMAL
PAYMENT PROCEDURE: NORMAL

## 2018-11-01 ENCOUNTER — TELEPHONE (OUTPATIENT)
Dept: OBGYN CLINIC | Age: 38
End: 2018-11-01

## 2018-11-01 NOTE — TELEPHONE ENCOUNTER
I have attempted to contact [phone] this patient by phone with the following results: left message to return my call on answering machine.

## 2018-11-01 NOTE — TELEPHONE ENCOUNTER
----- Message from Matthew Sierra MD sent at 11/1/2018  8:25 AM EDT -----  ECC normal. Please advise. Thank you.

## 2018-11-02 ENCOUNTER — TELEPHONE (OUTPATIENT)
Dept: OBGYN CLINIC | Age: 38
End: 2018-11-02

## 2018-11-02 NOTE — TELEPHONE ENCOUNTER
Good Morning,    Kwaku Camargo is returning the call about her test results . Please advise. Thank you .

## 2020-03-21 ENCOUNTER — APPOINTMENT (OUTPATIENT)
Dept: GENERAL RADIOLOGY | Facility: HOSPITAL | Age: 40
End: 2020-03-21

## 2020-03-21 ENCOUNTER — HOSPITAL ENCOUNTER (EMERGENCY)
Facility: HOSPITAL | Age: 40
Discharge: HOME | End: 2020-03-22
Attending: EMERGENCY MEDICINE | Admitting: EMERGENCY MEDICINE

## 2020-03-21 VITALS
WEIGHT: 165 LBS | SYSTOLIC BLOOD PRESSURE: 116 MMHG | HEART RATE: 99 BPM | OXYGEN SATURATION: 95 % | DIASTOLIC BLOOD PRESSURE: 69 MMHG | TEMPERATURE: 100 F | HEIGHT: 68 IN | BODY MASS INDEX: 25.01 KG/M2 | RESPIRATION RATE: 18 BRPM

## 2020-03-21 DIAGNOSIS — J10.1 INFLUENZA A: Primary | ICD-10-CM

## 2020-03-21 DIAGNOSIS — B34.9 ACUTE VIRAL SYNDROME: ICD-10-CM

## 2020-03-21 PROCEDURE — 87635 SARS-COV-2 COVID-19 AMP PRB: CPT | Performed by: EMERGENCY MEDICINE

## 2020-03-21 PROCEDURE — 71045 X-RAY EXAM CHEST 1 VIEW: CPT

## 2020-03-21 PROCEDURE — U0003 INFECTIOUS AGENT DETECTION BY NUCLEIC ACID (DNA OR RNA); SEVERE ACUTE RESPIRATORY SYNDROME CORONAVIRUS 2 (SARS-COV-2) (CORONAVIRUS DISEASE [COVID-19]), AMPLIFIED PROBE TECHNIQUE, MAKING USE OF HIGH THROUGHPUT TECHNOLOGIES AS DESCRIBED BY CMS-2020-01-R: HCPCS | Performed by: EMERGENCY MEDICINE

## 2020-03-21 PROCEDURE — 87804 INFLUENZA ASSAY W/OPTIC: CPT | Performed by: EMERGENCY MEDICINE

## 2020-03-21 PROCEDURE — 99283 EMERGENCY DEPT VISIT LOW MDM: CPT

## 2020-03-21 RX ORDER — ACETAMINOPHEN 500 MG
1000 TABLET ORAL ONCE
Status: COMPLETED | OUTPATIENT
Start: 2020-03-21 | End: 2020-03-21

## 2020-03-21 RX ADMIN — ACETAMINOPHEN 1000 MG: 500 TABLET, FILM COATED ORAL at 22:04

## 2020-03-22 LAB
FLUAV AG NPH QL: POSITIVE
FLUBV AG NPH QL IA: NEGATIVE

## 2020-03-28 ENCOUNTER — APPOINTMENT (OUTPATIENT)
Dept: CT IMAGING | Facility: HOSPITAL | Age: 40
End: 2020-03-28

## 2020-03-28 ENCOUNTER — HOSPITAL ENCOUNTER (EMERGENCY)
Facility: HOSPITAL | Age: 40
Discharge: HOME | End: 2020-03-28
Attending: EMERGENCY MEDICINE | Admitting: EMERGENCY MEDICINE

## 2020-03-28 VITALS
DIASTOLIC BLOOD PRESSURE: 95 MMHG | TEMPERATURE: 98.3 F | WEIGHT: 160 LBS | OXYGEN SATURATION: 97 % | RESPIRATION RATE: 20 BRPM | SYSTOLIC BLOOD PRESSURE: 129 MMHG | HEART RATE: 99 BPM | BODY MASS INDEX: 24.25 KG/M2 | HEIGHT: 68 IN

## 2020-03-28 DIAGNOSIS — R05.9 COUGH: ICD-10-CM

## 2020-03-28 DIAGNOSIS — J11.1 INFLUENZA: Primary | ICD-10-CM

## 2020-03-28 PROCEDURE — 71250 CT THORAX DX C-: CPT

## 2020-03-28 PROCEDURE — 99284 EMERGENCY DEPT VISIT MOD MDM: CPT

## 2020-03-28 PROCEDURE — 93005 ELECTROCARDIOGRAM TRACING: CPT | Performed by: EMERGENCY MEDICINE

## 2020-03-28 RX ORDER — SODIUM CHLORIDE 0.9 % (FLUSH) 0.9 %
10 SYRINGE (ML) INJECTION AS NEEDED
Status: DISCONTINUED | OUTPATIENT
Start: 2020-03-28 | End: 2020-03-28

## 2020-03-28 RX ADMIN — HYDROCODONE POLISTIREX AND CHLORPHENIRAMINE POLISTIREX 5 ML: 10; 8 SUSPENSION, EXTENDED RELEASE ORAL at 17:02

## 2020-04-04 LAB — SARS-COV-2 RNA RESP QL NAA+PROBE: NOT DETECTED

## 2020-04-05 NOTE — SIGNIFICANT NOTE
COVID-19 Test Result   Telephone Encounter    Patient Name: Sergey Patel  : 1980  MRN: [id number]    SARS-CoV-2, VINAY   Date Value Ref Range Status   2020 Not Detected Not Detected Final     Comment:     This test was developed and its performance characteristics determined  by [facility]. This test has not been FDA cleared or  approved. This test has been authorized by FDA under an Emergency Use  Authorization (EUA). This test has been validated in accordance with  the FDA's Guidance Document (Policy for Diagnostics Testing in  Laboratories Certified to Perform High Complexity Testing under CLIA  prior to Emergency Use Authorization for Coronavirus Disease-2019  during the Public Health Emergency) issued on 2020.  FDA independent review of this validation is pending. This test is  only authorized for the duration of time the declaration that  circumstances exist justifying the authorization of the emergency use  of in vitro diagnostic tests for detection of SARS-CoV-2 virus and/or  diagnosis of COVID-19 infection under section 564(b)(1) of the Act, 21  U.S.C. 360bbb-3(b)(1), unless the authorization is terminated or  revoked sooner.        Patient was counseled as follows:  • (-) negative COVID-19 test result  • The test is not perfect, so there is a chance it could be falsely negative or the virus level for too low for detection due to being very early in their infectious process.    • For this reason, Sergey Patel strongly encouraged to practice the safest standards in protecting their health and others given the endemic concerns.  Patient was advised to:  o Practice social distancing in the community  o Continue to wash their hands frequently with soap and hot water, and cover their cough.   • If patient develops non-emergent symptoms such as mild increased shortness of breath, fever, cough, or for other questions, Sergey Patel was asked to please call their primary  care physician’s office or the [place] hotline at [phone].  • Questions were engaged and answered to the best of my ability, patient expressed verbal understanding of their test results and my advice.      Primary Care Physician verified as being: Provider, No Known        Electronically signed by TORIE Woo, 04/05/20, 2:52 PM.